# Patient Record
Sex: FEMALE | Race: WHITE | NOT HISPANIC OR LATINO | Employment: OTHER | URBAN - METROPOLITAN AREA
[De-identification: names, ages, dates, MRNs, and addresses within clinical notes are randomized per-mention and may not be internally consistent; named-entity substitution may affect disease eponyms.]

---

## 2018-03-15 ENCOUNTER — PATIENT MESSAGE (OUTPATIENT)
Dept: ADMINISTRATIVE | Facility: CLINIC | Age: 53
End: 2018-03-15

## 2019-06-19 ENCOUNTER — LAB VISIT (OUTPATIENT)
Dept: LAB | Facility: HOSPITAL | Age: 54
End: 2019-06-19
Attending: INTERNAL MEDICINE
Payer: MEDICARE

## 2019-06-19 DIAGNOSIS — Z85.41 HISTORY OF CERVICAL CANCER: ICD-10-CM

## 2019-06-19 LAB
ALBUMIN SERPL BCP-MCNC: 4.4 G/DL (ref 3.5–5.2)
ALP SERPL-CCNC: 79 U/L (ref 38–145)
ALT SERPL W/O P-5'-P-CCNC: 12 U/L (ref 10–44)
ANION GAP SERPL CALC-SCNC: 9 MMOL/L (ref 8–16)
AST SERPL-CCNC: 26 U/L (ref 14–36)
BASOPHILS # BLD AUTO: 0.07 K/UL (ref 0–0.2)
BASOPHILS NFR BLD: 0.9 % (ref 0–1.9)
BILIRUB SERPL-MCNC: 0.3 MG/DL (ref 0.2–1.3)
BUN SERPL-MCNC: 17 MG/DL (ref 7–18)
CALCIUM SERPL-MCNC: 10.2 MG/DL (ref 8.4–10.2)
CHLORIDE SERPL-SCNC: 100 MMOL/L (ref 95–110)
CO2 SERPL-SCNC: 30 MMOL/L (ref 22–31)
CREAT SERPL-MCNC: 0.66 MG/DL (ref 0.5–1.4)
DIFFERENTIAL METHOD: NORMAL
EOSINOPHIL # BLD AUTO: 0.2 K/UL (ref 0–0.5)
EOSINOPHIL NFR BLD: 2.6 % (ref 0–8)
ERYTHROCYTE [DISTWIDTH] IN BLOOD BY AUTOMATED COUNT: 13.2 % (ref 11.5–14.5)
EST. GFR  (AFRICAN AMERICAN): >60 ML/MIN/1.73 M^2
EST. GFR  (NON AFRICAN AMERICAN): >60 ML/MIN/1.73 M^2
GLUCOSE SERPL-MCNC: 94 MG/DL (ref 70–110)
HCT VFR BLD AUTO: 39 % (ref 37–48.5)
HGB BLD-MCNC: 12.9 G/DL (ref 12–16)
IMM GRANULOCYTES # BLD AUTO: 0.02 K/UL (ref 0–0.04)
IMM GRANULOCYTES NFR BLD AUTO: 0.3 % (ref 0–0.5)
LYMPHOCYTES # BLD AUTO: 2.1 K/UL (ref 1–4.8)
LYMPHOCYTES NFR BLD: 26.9 % (ref 18–48)
MCH RBC QN AUTO: 29.9 PG (ref 27–31)
MCHC RBC AUTO-ENTMCNC: 33.1 G/DL (ref 32–36)
MCV RBC AUTO: 91 FL (ref 82–98)
MONOCYTES # BLD AUTO: 0.5 K/UL (ref 0.3–1)
MONOCYTES NFR BLD: 6.5 % (ref 4–15)
NEUTROPHILS # BLD AUTO: 5 K/UL (ref 1.8–7.7)
NEUTROPHILS NFR BLD: 62.8 % (ref 38–73)
NRBC BLD-RTO: 0 /100 WBC
PLATELET # BLD AUTO: 268 K/UL (ref 150–350)
PMV BLD AUTO: 10 FL (ref 9.2–12.9)
POTASSIUM SERPL-SCNC: 4.5 MMOL/L (ref 3.5–5.1)
PROT SERPL-MCNC: 7.9 G/DL (ref 6–8.4)
RBC # BLD AUTO: 4.31 M/UL (ref 4–5.4)
SODIUM SERPL-SCNC: 139 MMOL/L (ref 136–145)
WBC # BLD AUTO: 7.97 K/UL (ref 3.9–12.7)

## 2019-06-19 PROCEDURE — 85025 COMPLETE CBC W/AUTO DIFF WBC: CPT | Mod: PN

## 2019-06-19 PROCEDURE — 36415 COLL VENOUS BLD VENIPUNCTURE: CPT | Mod: PN

## 2019-06-19 PROCEDURE — 80053 COMPREHEN METABOLIC PANEL: CPT

## 2019-06-19 PROCEDURE — 80053 COMPREHEN METABOLIC PANEL: CPT | Mod: PN

## 2019-06-19 PROCEDURE — 85025 COMPLETE CBC W/AUTO DIFF WBC: CPT

## 2019-07-16 ENCOUNTER — OFFICE VISIT (OUTPATIENT)
Dept: FAMILY MEDICINE | Facility: CLINIC | Age: 54
End: 2019-07-16
Payer: MEDICARE

## 2019-07-16 VITALS
BODY MASS INDEX: 19.97 KG/M2 | HEIGHT: 64 IN | HEART RATE: 70 BPM | DIASTOLIC BLOOD PRESSURE: 80 MMHG | WEIGHT: 117 LBS | SYSTOLIC BLOOD PRESSURE: 142 MMHG

## 2019-07-16 DIAGNOSIS — R19.7 DIARRHEA, UNSPECIFIED TYPE: Chronic | ICD-10-CM

## 2019-07-16 DIAGNOSIS — F11.20 UNCOMPLICATED OPIOID DEPENDENCE: Chronic | ICD-10-CM

## 2019-07-16 DIAGNOSIS — G89.29 CHRONIC MIDLINE LOW BACK PAIN WITHOUT SCIATICA: Chronic | ICD-10-CM

## 2019-07-16 DIAGNOSIS — Z85.41 HISTORY OF CERVICAL CANCER: ICD-10-CM

## 2019-07-16 DIAGNOSIS — M54.50 CHRONIC MIDLINE LOW BACK PAIN WITHOUT SCIATICA: Chronic | ICD-10-CM

## 2019-07-16 DIAGNOSIS — R63.4 WEIGHT LOSS: Chronic | ICD-10-CM

## 2019-07-16 DIAGNOSIS — Z12.39 BREAST CANCER SCREENING: ICD-10-CM

## 2019-07-16 DIAGNOSIS — R10.33 PERIUMBILICAL ABDOMINAL PAIN: Primary | Chronic | ICD-10-CM

## 2019-07-16 PROCEDURE — 3008F PR BODY MASS INDEX (BMI) DOCUMENTED: ICD-10-PCS | Mod: ,,, | Performed by: INTERNAL MEDICINE

## 2019-07-16 PROCEDURE — 99204 PR OFFICE/OUTPT VISIT, NEW, LEVL IV, 45-59 MIN: ICD-10-PCS | Mod: ,,, | Performed by: INTERNAL MEDICINE

## 2019-07-16 PROCEDURE — 3008F BODY MASS INDEX DOCD: CPT | Mod: ,,, | Performed by: INTERNAL MEDICINE

## 2019-07-16 PROCEDURE — 99204 OFFICE O/P NEW MOD 45 MIN: CPT | Mod: ,,, | Performed by: INTERNAL MEDICINE

## 2019-07-16 RX ORDER — PANTOPRAZOLE SODIUM 40 MG/1
40 TABLET, DELAYED RELEASE ORAL DAILY
Refills: 3 | COMMUNITY
Start: 2019-07-01 | End: 2021-05-07

## 2019-07-16 NOTE — PROGRESS NOTES
Subjective:       Patient ID: Charleen Louis is a 53 y.o. female.    Chief Complaint: Establish Care; Abdominal Pain; Low-back Pain; Cervical Cancer; and Gastroesophageal Reflux    Patient is a 53-year-old  female who comes to establish with me as a new patient. Her medical history is significant for diagnosis of cervical cancer in 2009 following which she had a radical hysterectomy and salpingo-oophorectomy. This was followed with radiation as well as chemotherapy. This was all done in [facility]    Sometimes around 2013 she moved to the [facility]. She had established Oncology care with Dr. Kal Yao at that point. Following her surgery and chemotherapy she had developed intractable diarrhea. Somewhere around that time she had resection of approximately 3 feet of small intestine. 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 It is unclear if she had any infectious process at that point.    She also has chronic back problems with herniated disks which at this point is attributed to radiation. She has no history of trauma or accident.    Following all these she had also lost considerable amount of weight.    She is currently disabled and is on [employer] Medicare plan. She is nonalcohol user (rare daiquiriu0    She has been smoking since the age of 17. Current usage is approximately half pack per day.    Preventive care issues are still pending and unconfirmed at this point. She is due for mammogram. She has not had a colonoscopy which is somewhat technically difficult in her case possibly because of some stricture. She is also probably pending for shingles vaccine, tetanus vaccination and pneumonia vaccinations. Yearly flu shots have been recommended to her.    One  son lives in [place] and another [place].    She has a 17 year son who may have some special needs.    She is not in any active relation ship at this  point.      Past Medical History:   Diagnosis Date    Blood transfusion     Cancer 2009    Cervical    Wears glasses      Social History     Socioeconomic History    Marital status: Single     Spouse name: Not on file    Number of children: 3    Years of education: Not on file    Highest education level: Not on file   Occupational History    Occupation: Disabled    Occupation: Ex /office   Social Needs    Financial resource strain: Not on file    Food insecurity:     Worry: Not on file     Inability: Not on file    Transportation needs:     Medical: Not on file     Non-medical: Not on file   Tobacco Use    Smoking status: Current Every Day Smoker     Packs/day: 0.50     Years: 25.00     Pack years: 12.50     Types: Cigarettes     Start date: 1/1/1982    Smokeless tobacco: Current User   Substance and Sexual Activity    Alcohol use: No     Comment: occ daiquiri    Drug use: No    Sexual activity: Not Currently   Lifestyle    Physical activity:     Days per week: Not on file     Minutes per session: Not on file    Stress: Very much   Relationships    Social connections:     Talks on phone: Not on file     Gets together: Not on file     Attends [employer] service: Not on file     Active member of club or organization: Not on file     Attends meetings of clubs or organizations: Not on file     Relationship status: Not on file   Other Topics Concern    Not on file   Social History Narrative    17 Y Old with her at home    2 other grown and gone     Past Surgical History:   Procedure Laterality Date    CHOLECYSTECTOMY      HYSTERECTOMY      BSO    Port-a-Cath placement      REMOVAL, CATHETER, CENTRAL VENOUS, TUNNELED, WITH PORT Right 11/13/2013    Performed by Nick Kimbrough MD at [facility] OR    some of intestines removed       History reviewed. No pertinent family history.    Review of Systems   Constitutional: Positive for fatigue. Negative for activity change, chills, fever and  "unexpected weight change.   HENT: Negative for congestion, postnasal drip and sinus pressure.    Eyes: Negative for pain, discharge and visual disturbance.   Respiratory: Negative for cough, chest tightness and shortness of breath.    Cardiovascular: Negative for chest pain, palpitations and leg swelling.   Gastrointestinal: Positive for abdominal pain and diarrhea (stable with opaites). Negative for abdominal distention, anal bleeding and constipation.        Resection of colon - radiation damage and adhesions .   Genitourinary: Negative for difficulty urinating, dysuria, flank pain, frequency, menstrual problem and pelvic pain.        H/O Cervical Cancer 2009- Radical hysterectomy  [place] followed by RT and Chemo  30 weeks of external radiation and internal implants  Metastases   Musculoskeletal: Positive for back pain. Negative for arthralgias and joint swelling.        Back pain - herniated discs Blames radiation for it    Skin: Negative for color change, pallor and rash.   Allergic/Immunologic: Negative for environmental allergies, food allergies and immunocompromised state.   Neurological: Negative for dizziness, tremors, seizures, syncope, light-headedness and headaches.   Hematological: Negative for adenopathy. Does not bruise/bleed easily.   Psychiatric/Behavioral: Negative for agitation, confusion and dysphoric mood. The patient is not nervous/anxious.          Objective:      Blood pressure (!) 142/80, pulse 70, height 5' 4" (1.626 m), weight 53.1 kg (117 lb). Body mass index is 20.08 kg/m².  Physical Exam   Constitutional: She appears well-developed and well-nourished. She is cooperative. No distress.   HENT:   Head: Normocephalic and atraumatic.   Right Ear: Tympanic membrane normal.   Left Ear: Tympanic membrane normal.   Mouth/Throat: She has dentures (upper jaw).   Dry mouth   Eyes: Pupils are equal, round, and reactive to light. Conjunctivae, EOM and lids are normal. Lids are everted and swept, no " foreign bodies found. Right pupil is round and reactive. Left pupil is round and reactive.   Neck: Trachea normal and normal range of motion. Neck supple.   Cardiovascular: Normal rate, regular rhythm, S1 normal, S2 normal and normal heart sounds.   Pulmonary/Chest: Breath sounds normal.   Abdominal: Soft. Bowel sounds are normal. There is no rigidity and no guarding.       Musculoskeletal:        Back:    Lymphadenopathy:     She has no cervical adenopathy.     She has no axillary adenopathy.   Neurological: She is alert.   Skin: Skin is warm and dry.   Nursing note and vitals reviewed.        Assessment:       1. Periumbilical abdominal pain    2. Chronic midline low back pain without sciatica    3. Breast cancer screening    4. Weight loss    5. Diarrhea, unspecified type    6. Uncomplicated opioid dependence    7. History of cervical cancer           Lab Visit on 06/19/2019   Component Date Value Ref Range Status    WBC 06/19/2019 7.97  3.90 - 12.70 K/uL Final    RBC 06/19/2019 4.31  4.00 - 5.40 M/uL Final    Hemoglobin 06/19/2019 12.9  12.0 - 16.0 g/dL Final    Hematocrit 06/19/2019 39.0  37.0 - 48.5 % Final    Mean Corpuscular Volume 06/19/2019 91  82 - 98 fL Final    Mean Corpuscular Hemoglobin 06/19/2019 29.9  27.0 - 31.0 pg Final    Mean Corpuscular Hemoglobin Conc 06/19/2019 33.1  32.0 - 36.0 g/dL Final    RDW 06/19/2019 13.2  11.5 - 14.5 % Final    Platelets 06/19/2019 268  150 - 350 K/uL Final    MPV 06/19/2019 10.0  9.2 - 12.9 fL Final    Immature Granulocytes 06/19/2019 0.3  0.0 - 0.5 % Final    Gran # (ANC) 06/19/2019 5.0  1.8 - 7.7 K/uL Final    Immature Grans (Abs) 06/19/2019 0.02  0.00 - 0.04 K/uL Final    Lymph # 06/19/2019 2.1  1.0 - 4.8 K/uL Final    Mono # 06/19/2019 0.5  0.3 - 1.0 K/uL Final    Eos # 06/19/2019 0.2  0.0 - 0.5 K/uL Final    Baso # 06/19/2019 0.07  0.00 - 0.20 K/uL Final    nRBC 06/19/2019 0  0 /100 WBC Final    Gran% 06/19/2019 62.8  38.0 - 73.0 % Final     Lymph% 06/19/2019 26.9  18.0 - 48.0 % Final    Mono% 06/19/2019 6.5  4.0 - 15.0 % Final    Eosinophil% 06/19/2019 2.6  0.0 - 8.0 % Final    Basophil% 06/19/2019 0.9  0.0 - 1.9 % Final    Differential Method 06/19/2019 Automated   Final    Sodium 06/19/2019 139  136 - 145 mmol/L Final    Potassium 06/19/2019 4.5  3.5 - 5.1 mmol/L Final    Chloride 06/19/2019 100  95 - 110 mmol/L Final    CO2 06/19/2019 30  22 - 31 mmol/L Final    Glucose 06/19/2019 94  70 - 110 mg/dL Final    BUN, Bld 06/19/2019 17  7 - 18 mg/dL Final    Creatinine 06/19/2019 0.66  0.50 - 1.40 mg/dL Final    Calcium 06/19/2019 10.2  8.4 - 10.2 mg/dL Final    Total Protein 06/19/2019 7.9  6.0 - 8.4 g/dL Final    Albumin 06/19/2019 4.4  3.5 - 5.2 g/dL Final    Total Bilirubin 06/19/2019 0.3  0.2 - 1.3 mg/dL Final    Alkaline Phosphatase 06/19/2019 79  38 - 145 U/L Final    AST 06/19/2019 26  14 - 36 U/L Final    ALT 06/19/2019 12  10 - 44 U/L Final    Anion Gap 06/19/2019 9  8 - 16 mmol/L Final    eGFR if African American 06/19/2019 >60  >60 mL/min/1.73 m^2 Final    eGFR if non African American 06/19/2019 >60  >60 mL/min/1.73 m^2 Final         Plan:           Periumbilical abdominal pain  Comments:  Cause of this periumbilical pain is still unclear to me. History of colon resection. History of cervical cancer.    Chronic midline low back pain without sciatica  Comments:  Patient reports degenerative disc disease and arthritis.    Breast cancer screening  -     Mammo Digital Screening Bilat; Future; Expected date: 07/16/2019    Weight loss  Comments:  After being treated for cervical cancer and also having resection of colon she has lost approximately 30-40 pounds of weight over several years.    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    Uncomplicated opioid dependence  Comments:  Stable  dependency on opiate medications at this point.    History of cervical cancer      Patient has been advised to watch diet and exercise. Avoid fried and fatty food. Compliance to medications and follow up urged.  Mammogram ordered      Spent about 40-45 minutes with review of current condition and past medical records. Percent of time with face-to-face discussion on use of opiate medications, concomitant mood disorder or issues.    Discussed about tobacco dependence and plans to quit smoking.    Discussed about long-term goals, setting up small goals that time and issues of self-esteem, confidence and acceptance and peace within    Fup 6 weeks        Current Outpatient Medications:     HYDROmorphone (DILAUDID) 4 MG tablet, Take 1 tablet (4 mg total) by mouth every 4 (four) hours as needed for Pain., Disp: 180 tablet, Rfl: 0    morphine (MS CONTIN) 30 MG 12 hr tablet, Take 2 tablets (60 mg total) by mouth 2 (two) times daily., Disp: 120 tablet, Rfl: 0    pantoprazole (PROTONIX) 40 MG tablet, Take 40 mg by mouth once daily., Disp: , Rfl: 3

## 2019-07-16 NOTE — PATIENT INSTRUCTIONS
Abdominal Pain    Abdominal pain is pain in the stomach or belly area. Everyone has this pain from time to time. In many cases it goes away on its own. But abdominal pain can sometimes be due to a serious problem, such as appendicitis. So its important to know when to seek help.  Causes of abdominal pain  There are many possible causes of abdominal pain. Common causes in adults include:  · Constipation, diarrhea, or gas  · Stomach acid flowing back up into the esophagus (acid reflux or heartburn)  · Severe acid reflux, called GERD (gastroesophageal reflux disease)  · A sore in the lining of the stomach or small intestine (peptic ulcer)  · Inflammation of the gallbladder, liver, or pancreas  · Gallstones or kidney stones  · Appendicitis   · Intestinal blockage   · An internal organ pushing through a muscle or other tissue (hernia)  · Urinary tract infections  · In women, menstrual cramps, fibroids, or endometriosis  · Inflammation or infection of the intestines  Diagnosing the cause of abdominal pain  Your healthcare provider will do a physical exam help find the cause of your pain. If needed, tests will be ordered. Belly pain has many possible causes. So it can be hard to find the reason for your pain. Giving details about your pain can help. Tell your provider where and when you feel the pain, and what makes it better or worse. Also let your provider know if you have other symptoms such as:  · Fever  · Tiredness  · Upset stomach (nausea)  · Vomiting  · Changes in bathroom habits  Treating abdominal pain  Some causes of pain need emergency medical treatment right away. These include appendicitis or a bowel blockage. Other problems can be treated with rest, fluids, or medicines. Your healthcare provider can give you specific instructions for treatment or self-care based on what is causing your pain.  If you have vomiting or diarrhea, sip water or other clear fluids. When you are ready to eat solid foods again,  start with small amounts of easy-to-digest, low-fat foods. These include apple sauce, toast, or crackers.   When to seek medical care  Call 911 or go to the hospital right away if you:  · Cant pass stool and are vomiting  · Are vomiting blood or have bloody diarrhea or black, tarry diarrhea  · Have chest, neck, or shoulder pain  · Feel like you might pass out  · Have pain in your shoulder blades with nausea  · Have sudden, severe belly pain  · Have new, severe pain unlike any you have felt before  · Have a belly that is rigid, hard, and tender to touch  Call your healthcare provider if you have:  · Pain for more than 5 days  · Bloating for more than 2 days  · Diarrhea for more than 5 days  · A fever of 100.4°F (38.0°C) or higher, or as directed by your provider  · Pain that gets worse  · Weight loss for no reason  · Continued lack of appetite  · Blood in your stool  How to prevent abdominal pain  Here are some tips to help prevent abdominal pain:  · Eat smaller amounts of food at one time.  · Avoid greasy, fried, or other high-fat foods.  · Avoid foods that give you gas.  · Exercise regularly.  · Drink plenty of fluids.  To help prevent GERD symptoms:  · Quit smoking.  · Reduce alcohol and certain foods that increase stomach acid.  · Avoid aspirin and over-the-counter pain and fever medicines (NSAIDS or nonsteroidal anti-inflammatory drugs), if possible  · Lose extra weight.  · Finish eating at least 2 hours before you go to bed or lie down.  · Raise the head of your bed.  Date Last Reviewed: 7/1/2016  © 7409-1187 [employer]. [address] [place] [address], [place] [address]. All rights reserved. This information is not intended as a substitute for professional medical care. Always follow your healthcare professional's instructions.

## 2019-08-14 ENCOUNTER — TELEPHONE (OUTPATIENT)
Dept: SURGERY | Facility: CLINIC | Age: 54
End: 2019-08-14

## 2019-08-14 NOTE — TELEPHONE ENCOUNTER
----- Message from Tl Gonzalez sent at 8/14/2019  1:19 PM CDT -----  Contact: pt  Type:  Patient Returning Call    Who Called:  pt  Who Left Message for Patient:  Emile  Does the patient know what this is regarding?:    Best Call Back Number:  [phone]  Additional Information:

## 2019-08-14 NOTE — TELEPHONE ENCOUNTER
I called patient and scheduled her to see Dr. Callejas in [place] on Tuesday, 8/27/19 at 12:15pm.  Emile

## 2019-08-27 ENCOUNTER — OFFICE VISIT (OUTPATIENT)
Dept: SURGERY | Facility: CLINIC | Age: 54
End: 2019-08-27
Payer: MEDICARE

## 2019-08-27 DIAGNOSIS — R10.84 GENERALIZED ABDOMINAL PAIN: ICD-10-CM

## 2019-08-27 PROCEDURE — 99999 PR PBB SHADOW E&M-EST. PATIENT-LVL II: ICD-10-PCS | Mod: PBBFAC,,, | Performed by: SURGERY

## 2019-08-27 PROCEDURE — 99204 PR OFFICE/OUTPT VISIT, NEW, LEVL IV, 45-59 MIN: ICD-10-PCS | Mod: S$GLB,,, | Performed by: SURGERY

## 2019-08-27 PROCEDURE — 99204 OFFICE O/P NEW MOD 45 MIN: CPT | Mod: S$GLB,,, | Performed by: SURGERY

## 2019-08-27 PROCEDURE — 99999 PR PBB SHADOW E&M-EST. PATIENT-LVL II: CPT | Mod: PBBFAC,,, | Performed by: SURGERY

## 2019-08-27 NOTE — LETTER
August 27, 2019      Nolan Toledo MD  [address] [place] [address] [facility]  [facility] [address]           [facility] General Surgery  [address]  [facility] [address]  Phone: [phone]          Patient: Charleen Louis   MR Number: [id number]   YOB: 1965   Date of Visit: 8/27/2019       Dear Dr. Nolan Toledo:    Thank you for referring Charleen Louis to me for evaluation. Attached you will find relevant portions of my assessment and plan of care.    If you have questions, please do not hesitate to call me. I look forward to following Charleen Louis along with you.    Sincerely,    Segundo Callejas MD    Enclosure  CC:  No Recipients    If you would like to receive this communication electronically, please contact [email or web address] or [phone] to request more information on [employer] access.    For providers and/or their staff who would like to refer a patient to [facility], please contact us through our one-stop-shop provider referral line, [facility], at [phone].    If you feel you have received this communication in error or would no longer like to receive these types of communications, please e-mail [email or web address]

## 2019-08-27 NOTE — PROGRESS NOTES
Subjective:       Patient ID: Charleen Louis is a 54 y.o. female.    Chief Complaint: No chief complaint on file.    HPI  Pleasant 53 yo F referred to me for evaluation of possible colonic stricture. Pt notes that she had cervical cancer in the remote past.  She was treated with radical hysterectomy and received both chemo and radiation therapy.  Pt notes that as a result of that treatment she has been having significant abodminal pains and complaints for years.  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 Pt reports that she has had two previous attempts at colonoscopy in 2010 which could not be completed secondary to stricture per the pt.  She notes that she has chronic pain.  Has swelling in her abdomen.   Pt notes that given distention and bloating it has caused significant dietary changes and with that accompanying weight loss.  She feels swelling is from gastroparesis as she states that she has history of gastroparesis.  Pt denies other PMHx.  NO other significant PShx other then hysterectom and SBR.  Pt had PET/CT scan in 7/19 with no evidence of disease noted.   Review of Systems   Constitutional: Positive for appetite change and unexpected weight change. Negative for activity change and fever.   Respiratory: Negative for chest tightness, shortness of breath and wheezing.    Cardiovascular: Negative for chest pain.   Gastrointestinal: Positive for abdominal distention and diarrhea. Negative for abdominal pain, anal bleeding, blood in stool, constipation, nausea, rectal pain and vomiting.   Genitourinary: Negative for difficulty urinating, dysuria and frequency.   Skin: Negative for color change and wound.   Neurological: Negative for dizziness.   Hematological: Negative for adenopathy.   Psychiatric/Behavioral: Negative for agitation and decreased concentration.       Objective:      Physical Exam   Constitutional: She is oriented to  person, place, and time. She appears well-developed and well-nourished.   HENT:   Head: Normocephalic and atraumatic.   Eyes: Pupils are equal, round, and reactive to light. Right eye exhibits no discharge. Left eye exhibits no discharge. No scleral icterus.   Neck: Normal range of motion. Neck supple. No tracheal deviation present. No thyromegaly present.   Cardiovascular: Normal rate, regular rhythm and normal heart sounds.   No murmur heard.  Pulmonary/Chest: Effort normal and breath sounds normal. She exhibits no tenderness.   Abdominal: Soft. Bowel sounds are normal. She exhibits no distension, no abdominal bruit, no pulsatile midline mass and no mass. There is no hepatosplenomegaly. There is no tenderness. There is no rigidity, no rebound, no guarding, no tenderness at McBurney's point and negative Caba's sign. No hernia. Hernia confirmed negative in the ventral area.   Genitourinary: Rectum normal.   Musculoskeletal: Normal range of motion. She exhibits no edema, tenderness or deformity.   Lymphadenopathy:     She has no cervical adenopathy.   Neurological: She is alert and oriented to person, place, and time.   Skin: Skin is warm. No rash noted. No erythema.   Psychiatric: She has a normal mood and affect.   Vitals reviewed.      Assessment:       1. Generalized abdominal pain        Plan:       Lengthy d/w pt. Will need to obtain updated radiographic images. I have recommended beginning with ct scan of the abd pevlis to evaluate the area and to assess for enteritis.  Also will need to perform BE to assess for stricture.  WIll make further recommendations based on the results of these findings.

## 2019-08-28 ENCOUNTER — LAB VISIT (OUTPATIENT)
Dept: LAB | Facility: HOSPITAL | Age: 54
End: 2019-08-28
Attending: SURGERY
Payer: MEDICARE

## 2019-08-28 ENCOUNTER — OFFICE VISIT (OUTPATIENT)
Dept: FAMILY MEDICINE | Facility: CLINIC | Age: 54
End: 2019-08-28
Payer: MEDICARE

## 2019-08-28 VITALS
WEIGHT: 115 LBS | SYSTOLIC BLOOD PRESSURE: 137 MMHG | HEIGHT: 64 IN | BODY MASS INDEX: 19.63 KG/M2 | HEART RATE: 84 BPM | DIASTOLIC BLOOD PRESSURE: 86 MMHG

## 2019-08-28 DIAGNOSIS — F41.1 GENERALIZED ANXIETY DISORDER: ICD-10-CM

## 2019-08-28 DIAGNOSIS — F32.9 REACTIVE DEPRESSION: ICD-10-CM

## 2019-08-28 DIAGNOSIS — R63.4 WEIGHT LOSS: ICD-10-CM

## 2019-08-28 DIAGNOSIS — E78.2 MIXED HYPERLIPIDEMIA: ICD-10-CM

## 2019-08-28 DIAGNOSIS — R10.84 GENERALIZED ABDOMINAL PAIN: ICD-10-CM

## 2019-08-28 DIAGNOSIS — R10.33 PERIUMBILICAL ABDOMINAL PAIN: Primary | ICD-10-CM

## 2019-08-28 LAB
CREAT SERPL-MCNC: 0.8 MG/DL (ref 0.5–1.4)
EST. GFR  (AFRICAN AMERICAN): >60 ML/MIN/1.73 M^2
EST. GFR  (NON AFRICAN AMERICAN): >60 ML/MIN/1.73 M^2

## 2019-08-28 PROCEDURE — 3008F BODY MASS INDEX DOCD: CPT | Mod: S$GLB,,, | Performed by: INTERNAL MEDICINE

## 2019-08-28 PROCEDURE — 99213 OFFICE O/P EST LOW 20 MIN: CPT | Mod: S$GLB,,, | Performed by: INTERNAL MEDICINE

## 2019-08-28 PROCEDURE — 99999 PR PBB SHADOW E&M-EST. PATIENT-LVL IV: ICD-10-PCS | Mod: PBBFAC,,, | Performed by: INTERNAL MEDICINE

## 2019-08-28 PROCEDURE — 82565 ASSAY OF CREATININE: CPT

## 2019-08-28 PROCEDURE — 36415 COLL VENOUS BLD VENIPUNCTURE: CPT

## 2019-08-28 PROCEDURE — 3008F PR BODY MASS INDEX (BMI) DOCUMENTED: ICD-10-PCS | Mod: S$GLB,,, | Performed by: INTERNAL MEDICINE

## 2019-08-28 PROCEDURE — 99999 PR PBB SHADOW E&M-EST. PATIENT-LVL IV: CPT | Mod: PBBFAC,,, | Performed by: INTERNAL MEDICINE

## 2019-08-28 PROCEDURE — 99213 PR OFFICE/OUTPT VISIT, EST, LEVL III, 20-29 MIN: ICD-10-PCS | Mod: S$GLB,,, | Performed by: INTERNAL MEDICINE

## 2019-08-28 NOTE — PATIENT INSTRUCTIONS
Abdominal Pain    Abdominal pain is pain in the stomach or belly area. Everyone has this pain from time to time. In many cases it goes away on its own. But abdominal pain can sometimes be due to a serious problem, such as appendicitis. So its important to know when to seek help.  Causes of abdominal pain  There are many possible causes of abdominal pain. Common causes in adults include:  · Constipation, diarrhea, or gas  · Stomach acid flowing back up into the esophagus (acid reflux or heartburn)  · Severe acid reflux, called GERD (gastroesophageal reflux disease)  · A sore in the lining of the stomach or small intestine (peptic ulcer)  · Inflammation of the gallbladder, liver, or pancreas  · Gallstones or kidney stones  · Appendicitis   · Intestinal blockage   · An internal organ pushing through a muscle or other tissue (hernia)  · Urinary tract infections  · In women, menstrual cramps, fibroids, or endometriosis  · Inflammation or infection of the intestines  Diagnosing the cause of abdominal pain  Your healthcare provider will do a physical exam help find the cause of your pain. If needed, tests will be ordered. Belly pain has many possible causes. So it can be hard to find the reason for your pain. Giving details about your pain can help. Tell your provider where and when you feel the pain, and what makes it better or worse. Also let your provider know if you have other symptoms such as:  · Fever  · Tiredness  · Upset stomach (nausea)  · Vomiting  · Changes in bathroom habits  Treating abdominal pain  Some causes of pain need emergency medical treatment right away. These include appendicitis or a bowel blockage. Other problems can be treated with rest, fluids, or medicines. Your healthcare provider can give you specific instructions for treatment or self-care based on what is causing your pain.  If you have vomiting or diarrhea, sip water or other clear fluids. When you are ready to eat solid foods again,  start with small amounts of easy-to-digest, low-fat foods. These include apple sauce, toast, or crackers.   When to seek medical care  Call 911 or go to the hospital right away if you:  · Cant pass stool and are vomiting  · Are vomiting blood or have bloody diarrhea or black, tarry diarrhea  · Have chest, neck, or shoulder pain  · Feel like you might pass out  · Have pain in your shoulder blades with nausea  · Have sudden, severe belly pain  · Have new, severe pain unlike any you have felt before  · Have a belly that is rigid, hard, and tender to touch  Call your healthcare provider if you have:  · Pain for more than 5 days  · Bloating for more than 2 days  · Diarrhea for more than 5 days  · A fever of 100.4°F (38.0°C) or higher, or as directed by your provider  · Pain that gets worse  · Weight loss for no reason  · Continued lack of appetite  · Blood in your stool  How to prevent abdominal pain  Here are some tips to help prevent abdominal pain:  · Eat smaller amounts of food at one time.  · Avoid greasy, fried, or other high-fat foods.  · Avoid foods that give you gas.  · Exercise regularly.  · Drink plenty of fluids.  To help prevent GERD symptoms:  · Quit smoking.  · Reduce alcohol and certain foods that increase stomach acid.  · Avoid aspirin and over-the-counter pain and fever medicines (NSAIDS or nonsteroidal anti-inflammatory drugs), if possible  · Lose extra weight.  · Finish eating at least 2 hours before you go to bed or lie down.  · Raise the head of your bed.  Date Last Reviewed: 7/1/2016  © 7137-1742 [employer]. [address] [place] [address], [place] [address]. All rights reserved. This information is not intended as a substitute for professional medical care. Always follow your healthcare professional's instructions.        Anxiety Reaction  Anxiety is the feeling we all get when we think something bad might happen. It is a normal response to stress and usually causes only a mild reaction.  When anxiety becomes more severe, it can interfere with daily life. In some cases, you may not even be aware of what it is youre anxious about. There may also be a genetic link or it may be a learned behavior in the home.  Both psychological and physical triggers cause stress reaction. It's often a response to fear or emotional stress, real or imagined. This stress may come from home, family, work, or social relationships.  During an anxiety reaction, you may feel:  · Helpless  · Nervous  · Depressed  · Irritable  Your body may show signs of anxiety in many ways. You may experience:  · Dry mouth  · Shakiness  · Dizziness  · Weakness  · Trouble breathing  · Breathing fast (hyperventilating)  · Chest pressure  · Sweating  · Headache  · Nausea  · Diarrhea  · Tiredness  · Inability to sleep  · Sexual problems  Home care  · Try to locate the sources of stress in your life. They may not be obvious. These may include:  ¨ Daily hassles of life (traffic jams, missed appointments, car troubles, etc.)  ¨ Major life changes, both good (new baby, job promotion) and bad (loss of job, loss of loved one)  ¨ Overload: feeling that you have too many responsibilities and can't take care of all of them at once  ¨ Feeling helpless, feeling that your problems are beyond what youre able to solve  · Notice how your body reacts to stress. Learn to listen to your body signals. This will help you take action before the stress becomes severe.  · When you can, do something about the source of your stress. (Avoid hassles, limit the amount of change that happens in your life at one time and take a break when you feel overloaded).  · Unfortunately, many stressful situations can't be avoided. It is necessary to learn how to better manage stress. There are many proven methods that will reduce your anxiety. These include simple things like exercise, good nutrition and adequate rest. Also, there are certain techniques that are  helpful:  ¨ Relaxation  ¨ Breathing exercises  ¨ Visualization  ¨ Biofeedback  ¨ Meditation  For more information about this, consult your doctor or go to a local bookstore and review the many books and tapes available on this subject.  Follow-up care  If you feel that your anxiety is not responding to self-help measures, contact your doctor or make an appointment with a counselor. You may need short-term psychological counseling and temporary medicine to help you manage stress.  Call 911  Call your healthcare provider right away if any of these occur:  · Trouble breathing  · Confusion  · Drowsiness or trouble wakening  · Fainting or loss of consciousness  · Rapid heart rate  · Seizure  · New chest pain that becomes more severe, lasts longer, or spreads into your shoulder, arm, neck, jaw, or back  When to seek medical advice  Call your healthcare provider right away if any of these occur:  · Your symptoms get worse  · Severe headache not relieved by rest and mild pain reliever  Date Last Reviewed: 9/29/2015  © 0963-9285 [employer], [employer]. [address] [place] [address], [place] [address]. All rights reserved. This information is not intended as a substitute for professional medical care. Always follow your healthcare professional's instructions.

## 2019-08-29 ENCOUNTER — HOSPITAL ENCOUNTER (OUTPATIENT)
Dept: RADIOLOGY | Facility: HOSPITAL | Age: 54
Discharge: HOME | End: 2019-08-29
Attending: SURGERY
Payer: MEDICARE

## 2019-08-29 DIAGNOSIS — R10.84 GENERALIZED ABDOMINAL PAIN: ICD-10-CM

## 2019-08-29 PROCEDURE — 74177 CT ABD & PELVIS W/CONTRAST: CPT | Mod: TC

## 2019-08-29 PROCEDURE — 25500020 PHARM REV CODE 255: Performed by: SURGERY

## 2019-08-29 RX ADMIN — IOHEXOL 100 ML: 350 INJECTION, SOLUTION INTRAVENOUS at 10:08

## 2019-09-06 ENCOUNTER — HOSPITAL ENCOUNTER (OUTPATIENT)
Dept: RADIOLOGY | Facility: HOSPITAL | Age: 54
Discharge: HOME | End: 2019-09-06
Attending: SURGERY
Payer: MEDICARE

## 2019-09-06 DIAGNOSIS — R10.84 GENERALIZED ABDOMINAL PAIN: ICD-10-CM

## 2019-09-06 PROCEDURE — 74270 X-RAY XM COLON 1CNTRST STD: CPT | Mod: 26,,, | Performed by: RADIOLOGY

## 2019-09-06 PROCEDURE — 74270 X-RAY XM COLON 1CNTRST STD: CPT | Mod: TC

## 2019-09-06 PROCEDURE — 74270 FL BARIUM ENEMA: ICD-10-PCS | Mod: 26,,, | Performed by: RADIOLOGY

## 2019-09-18 ENCOUNTER — TELEPHONE (OUTPATIENT)
Dept: SURGERY | Facility: CLINIC | Age: 54
End: 2019-09-18

## 2019-09-18 NOTE — TELEPHONE ENCOUNTER
----- Message from Vandana Rivera sent at 9/18/2019  2:29 PM CDT -----  Type: Needs Medical Advice    Who Called:  Patient  Best Call Back Number: [phone]  Additional Information: Patient would like to speak with nurse concerning last visit and what is next/please call patient back to advise.

## 2019-09-21 ENCOUNTER — TELEPHONE (OUTPATIENT)
Dept: SURGERY | Facility: CLINIC | Age: 54
End: 2019-09-21

## 2019-09-21 NOTE — TELEPHONE ENCOUNTER
Attempted to call pt to notify results of studies.  NO significant findings.  Do not feel surgery is indicated in this pt.  Will have office reach out to pt

## 2019-09-23 ENCOUNTER — TELEPHONE (OUTPATIENT)
Dept: SURGERY | Facility: CLINIC | Age: 54
End: 2019-09-23

## 2019-09-23 NOTE — TELEPHONE ENCOUNTER
----- Message from Osiris Mcgregor sent at 9/23/2019  1:20 PM CDT -----  Contact: patient  Type: Needs Medical Advice    Who Called:  patient  Symptoms (please be specific):  na  How long has patient had these symptoms:  chong  Pharmacy name and phone #:  chong  Best Call Back Number: [phone]  Additional Information: Patient states she received call from Dr. Callejas Saturday and was told to return call toady. Please call to advise. Thanks!

## 2019-10-29 ENCOUNTER — OFFICE VISIT (OUTPATIENT)
Dept: PSYCHIATRY | Facility: CLINIC | Age: 54
End: 2019-10-29
Payer: MEDICARE

## 2019-10-29 VITALS
SYSTOLIC BLOOD PRESSURE: 118 MMHG | DIASTOLIC BLOOD PRESSURE: 79 MMHG | HEART RATE: 78 BPM | HEIGHT: 63 IN | WEIGHT: 116.63 LBS | BODY MASS INDEX: 20.66 KG/M2

## 2019-10-29 DIAGNOSIS — F32.A ANXIETY AND DEPRESSION: Primary | ICD-10-CM

## 2019-10-29 DIAGNOSIS — F41.9 ANXIETY AND DEPRESSION: Primary | ICD-10-CM

## 2019-10-29 PROCEDURE — 3008F PR BODY MASS INDEX (BMI) DOCUMENTED: ICD-10-PCS | Mod: CPTII,S$GLB,, | Performed by: PSYCHOLOGIST

## 2019-10-29 PROCEDURE — 99202 PR OFFICE/OUTPT VISIT, NEW, LEVL II, 15-29 MIN: ICD-10-PCS | Mod: S$GLB,,, | Performed by: PSYCHOLOGIST

## 2019-10-29 PROCEDURE — 99999 PR PBB SHADOW E&M-EST. PATIENT-LVL III: CPT | Mod: PBBFAC,,, | Performed by: PSYCHOLOGIST

## 2019-10-29 PROCEDURE — 99999 PR PBB SHADOW E&M-EST. PATIENT-LVL III: ICD-10-PCS | Mod: PBBFAC,,, | Performed by: PSYCHOLOGIST

## 2019-10-29 PROCEDURE — 99202 OFFICE O/P NEW SF 15 MIN: CPT | Mod: S$GLB,,, | Performed by: PSYCHOLOGIST

## 2019-10-29 PROCEDURE — 3008F BODY MASS INDEX DOCD: CPT | Mod: CPTII,S$GLB,, | Performed by: PSYCHOLOGIST

## 2019-10-29 RX ORDER — TRAZODONE HYDROCHLORIDE 50 MG/1
TABLET ORAL
Qty: 30 TABLET | Refills: 1 | Status: SHIPPED | OUTPATIENT
Start: 2019-10-29 | End: 2019-12-20 | Stop reason: SDUPTHER

## 2019-10-29 RX ORDER — DULOXETIN HYDROCHLORIDE 30 MG/1
CAPSULE, DELAYED RELEASE ORAL
Qty: 30 CAPSULE | Refills: 1 | Status: SHIPPED | OUTPATIENT
Start: 2019-10-29 | End: 2019-12-20

## 2019-10-29 NOTE — PATIENT INSTRUCTIONS
Start Cymbalta 30mg hs  Start Trazodone 50mg 1/2 to 1 tablet at bedtime  Stagger start dates  Refer to therapy  RTC 4 weeks, call if problems with medications or concerns

## 2019-10-29 NOTE — PROGRESS NOTES
Client: Charleen Louis  : 1965  Suhail Tomlinson MD  [id number]    Presenting complaint:/Past psychiatric treatment: Charleen presented with a history of recurrent depression and anxiety that has escalated this year.  She has an adult son with a substance use problem.  She also has a remote history of substance abuse with her last use reported as  when she had a relapse.  One son also  in July and the son living with her has Asbergers.   On the PHQ9  her score today was 24  On the  GAD7  17.  She denied SI/HI/delusions/halluciantions/mood swings and expansive mood periods. She reported ease of frustration but sts this is situational.  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  She denied any other psychiatric inpatient care.  Stressors: death of 31yo son in July, another son   Has Asbergers and one adult son  in .      Family psychiatric history: none reported  Relevant lab reviewed  CMP CBC normal  Relevant EKG reviewed  ST st abnormalities    Review of patient's allergies indicates:  No Known Allergies    Current Outpatient Medications:     HYDROmorphone (DILAUDID) 4 MG tablet, Take 1 tablet (4 mg total) by mouth every 4 (four) hours as needed for Pain., Disp: 180 tablet, Rfl: 0    morphine (MS CONTIN) 30 MG 12 hr tablet, Take 2 tablets (60 mg total) by mouth 2 (two) times daily., Disp: 120 tablet, Rfl: 0    pantoprazole (PROTONIX) 40 MG tablet, Take 40 mg by mouth once daily., Disp: , Rfl: 3  Past Medical History:   Diagnosis Date    Blood transfusion     Cancer 2009    Cervical    Wears glasses        Clinical presentation:  Appearance:  The client presented in casual attire evidencing good grooming and hygiene    Neuro:  the client was alert, oriented x 4 and evidenced good judgement and insight.      Attention/concentration:  Was good in session    Memory:  The client had no subjective memory complaints and she was able to  recall information discussed in session accurately    Judgement:  behavior is adequate to the situation    Fund of knowledge:  intact and appropriate to age and level of education    Gait/station:  was normal    Heart: the patients heartbeat was regular in rate and rhythm.  Has mild MV regurg; trivial tricuspid regurg mild pulm valve regurg.  .    Lung: clear bilaterally, normal     Skin/Extremities:  warm and dry, no rashes/lesions/bruises or edema noted.. Nailbeds were pink and refill was good    Mood:  was mildly dysthymic.  No SI/HI/delusions/hallucinations/mood swings or expansive mood periods reported or suspected.     Affect: was normal range    Speech:  normal rate and tone     Sleep: the client has disrupted sleep due to pain complaints. Does not follow sleep hygiene strategies    Appetite: was reported as good, does skip meals  Has lost 34 lbs over the last 6 months.    Pain complaints:  none were voiced    ETOH/Substance use history:  The client had no reported ETOH/or other substance use problems     Psychosocial history:  The client currently lives with her 18yo son with [place].  She gave one child up at birth, one son , one lives with hts father (addiction history).  They reported having positive peer support and state they engage in leisure activies regularly.    Education/session discussion:  · Reviewed limits of confidentiality, missed appt/late show rules, need to arrive on time and expectation they will be an active participant in their care by asking questions, notifying staff of any medical problems or problems with medications. Advised client that medication refills are not usually made for 90 supplies and that appointments must be kept for refills to be authorized.  · Discussed the need for regular, restful sleep and strategies that help promote good sleep.  Reviewed the negative impact of alcohol, smoking, and caffeine on sleep with the client.  The client was given educations  information about sleep/insomnia and sleep hygiene for home reference.  · Discussed general issues about treatment of depression/anxiety/sleep including utility of medication and therapy. Discussed the risks/benefits/alternatives of medication with client.  Reviewed typical side effects and potential adverse reactions of an antidepressant medication including but not limited to teratogenicity, orthostatic changes, increased risk of SI, risk of mood swings, risk of electrolyte disturbance and increased risk of bleeding.  The client was given a handout about her medications (Cymbalta Trazodone)  for home reference. The client appeared to understand the information shared based on their questions and responses made in session.      Impression: Charleen has anxiety and depression that should respond to medication and therapy Prognosis good    Plan:  Start Cymbalta 30mg hs  Start Trazodone 50mg 1/2 to 1 tablet at bedtime  Stagger start dates  TSH T4 drugs of abuse screen  Refer to therapy  RTC 4 weeks and prn    Session:  4430-4212

## 2019-10-30 ENCOUNTER — TELEPHONE (OUTPATIENT)
Dept: PSYCHIATRY | Facility: CLINIC | Age: 54
End: 2019-10-30

## 2019-10-30 NOTE — TELEPHONE ENCOUNTER
Pt returned call to clinic regarding below message. Scheduled f/u appt. Pt states she also needs therapy appt. appt scheduled per referral. Appt date, time, and location given. Pt verbalized understanding with no further questions.

## 2019-11-04 ENCOUNTER — LAB VISIT (OUTPATIENT)
Dept: LAB | Facility: HOSPITAL | Age: 54
End: 2019-11-04
Attending: PSYCHOLOGIST
Payer: MEDICARE

## 2019-11-04 DIAGNOSIS — E78.2 MIXED HYPERLIPIDEMIA: ICD-10-CM

## 2019-11-04 DIAGNOSIS — F32.A ANXIETY AND DEPRESSION: ICD-10-CM

## 2019-11-04 DIAGNOSIS — F41.9 ANXIETY AND DEPRESSION: ICD-10-CM

## 2019-11-04 LAB
CHOLEST SERPL-MCNC: 142 MG/DL (ref 120–199)
CHOLEST/HDLC SERPL: 2.3 {RATIO} (ref 2–5)
HDLC SERPL-MCNC: 62 MG/DL (ref 40–75)
HDLC SERPL: 43.7 % (ref 20–50)
LDLC SERPL CALC-MCNC: 70 MG/DL (ref 63–159)
NONHDLC SERPL-MCNC: 80 MG/DL
T4 FREE SERPL-MCNC: 1.15 NG/DL (ref 0.71–1.51)
TRIGL SERPL-MCNC: 50 MG/DL (ref 30–150)
TSH SERPL DL<=0.005 MIU/L-ACNC: 0.84 UIU/ML (ref 0.4–4)

## 2019-11-04 PROCEDURE — 80307 DRUG TEST PRSMV CHEM ANLYZR: CPT

## 2019-11-04 PROCEDURE — 84439 ASSAY OF FREE THYROXINE: CPT

## 2019-11-04 PROCEDURE — 84443 ASSAY THYROID STIM HORMONE: CPT

## 2019-11-04 PROCEDURE — 80061 LIPID PANEL: CPT

## 2019-11-06 LAB
AMPHETAMINES SERPL QL: NEGATIVE
BARBITURATES SERPL QL SCN: NEGATIVE
BENZODIAZ SERPL QL SCN: NEGATIVE
BZE SERPL QL: NEGATIVE
CARBOXYTHC SERPL QL SCN: POSITIVE
ETHANOL SERPL QL SCN: NEGATIVE
METHADONE SERPL QL SCN: NEGATIVE
OPIATES SERPL QL SCN: POSITIVE
PCP SERPL QL SCN: NEGATIVE
PROPOXYPH SERPL QL: NEGATIVE

## 2019-12-03 ENCOUNTER — OFFICE VISIT (OUTPATIENT)
Dept: FAMILY MEDICINE | Facility: CLINIC | Age: 54
End: 2019-12-03
Payer: MEDICARE

## 2019-12-03 VITALS
HEART RATE: 80 BPM | SYSTOLIC BLOOD PRESSURE: 121 MMHG | WEIGHT: 115 LBS | DIASTOLIC BLOOD PRESSURE: 64 MMHG | BODY MASS INDEX: 20.38 KG/M2 | HEIGHT: 63 IN

## 2019-12-03 DIAGNOSIS — F41.1 GENERALIZED ANXIETY DISORDER: ICD-10-CM

## 2019-12-03 DIAGNOSIS — R10.33 PERIUMBILICAL ABDOMINAL PAIN: Primary | ICD-10-CM

## 2019-12-03 NOTE — PROGRESS NOTES
Subjective:       Patient ID: Charleen Louis is a 54 y.o. female.    Chief Complaint: Abdominal Pain    Patient complains of abdominal cramps and pains.  She recently had a CT scan of abdomen which was unremarkable except for some surgical clips from prior radical hysterectomy.  She had a barium enema which was unremarkable.  No cause of her abdominal pain or and stricture was defined on the CT scans.    Patient complains of abdominal cramps intermittently.  She would like her small bowels to be checked out.    I am not sure if she has an appointment with gastroenterologist or she has kept an appointment with gastroenterologist in past.    As I was discussing about various possibilities of abdominal pain and possible role of opiate medications she kept on frequently interrupting me and tended to be somewhat loud and labile.  She had to be frequently advised to let me finish my conversation.      Past Medical History:   Diagnosis Date    Blood transfusion     Cancer 2009    Cervical    Wears glasses      Social History     Socioeconomic History    Marital status: Single     Spouse name: Not on file    Number of children: 3    Years of education: Not on file    Highest education level: Not on file   Occupational History    Occupation: Disabled    Occupation: Ex /office   Social Needs    Financial resource strain: Not on file    Food insecurity:     Worry: Not on file     Inability: Not on file    Transportation needs:     Medical: Not on file     Non-medical: Not on file   Tobacco Use    Smoking status: Current Every Day Smoker     Packs/day: 0.50     Years: 25.00     Pack years: 12.50     Types: Cigarettes     Start date: 1/1/1982    Smokeless tobacco: Current User   Substance and Sexual Activity    Alcohol use: No     Comment: occ daiquiri    Drug use: No    Sexual activity: Not Currently   Lifestyle    Physical activity:     Days per week: Not on file     Minutes per session: Not on file     Stress: Very much   Relationships    Social connections:     Talks on phone: Not on file     Gets together: Not on file     Attends Scientologist service: Not on file     Active member of club or organization: Not on file     Attends meetings of clubs or organizations: Not on file     Relationship status: Not on file   Other Topics Concern    Not on file   Social History Narrative    17 Y Old with her at home    2 other grown and gone     Past Surgical History:   Procedure Laterality Date    CHOLECYSTECTOMY      HYSTERECTOMY      BSO    Port-a-Cath placement      some of intestines removed       History reviewed. No pertinent family history.    Review of Systems   Constitutional: Positive for fatigue. Negative for activity change, chills, fever and unexpected weight change (loss 1 lb).   HENT: Negative for congestion, postnasal drip and sinus pressure.    Eyes: Negative for pain, discharge and visual disturbance.   Respiratory: Negative for cough, chest tightness and shortness of breath.    Cardiovascular: Negative for chest pain, palpitations and leg swelling.   Gastrointestinal: Positive for abdominal pain and diarrhea (stable with opaites). Negative for abdominal distention, anal bleeding and constipation.        Resection of colon - radiation damage and adhesions .   Genitourinary: Negative for difficulty urinating, dysuria, flank pain, frequency, menstrual problem and pelvic pain.        H/O Cervical Cancer 2009- Radical hysterectomy  [place] followed by RT and Chemo  30 weeks of external radiation and internal implants  Metastases   Musculoskeletal: Positive for back pain. Negative for arthralgias and joint swelling.        Back pain - herniated discs Blames radiation for it    Skin: Negative for color change, pallor and rash.   Allergic/Immunologic: Negative for environmental allergies, food allergies and immunocompromised state.   Neurological: Negative for dizziness, tremors, seizures and headaches.  "  Hematological: Negative for adenopathy. Does not bruise/bleed easily.   Psychiatric/Behavioral: Positive for behavioral problems and sleep disturbance. Negative for agitation, confusion and dysphoric mood. The patient is nervous/anxious.          Objective:      Blood pressure 121/64, pulse 80, height 5' 3" (1.6 m), weight 52.2 kg (115 lb). Body mass index is 20.37 kg/m².  Physical Exam   Eyes: Right pupil is round and reactive. Left pupil is round and reactive.   Psychiatric: Her mood appears anxious. Her speech is rapid and/or pressured.   Frequently interruptive.   Nursing note and vitals reviewed.        Assessment:       1. Periumbilical abdominal pain    2. Generalized anxiety disorder           Lab Visit on 11/04/2019   Component Date Value Ref Range Status    Cholesterol 11/04/2019 142  120 - 199 mg/dL Final    Triglycerides 11/04/2019 50  30 - 150 mg/dL Final    HDL 11/04/2019 62  40 - 75 mg/dL Final    LDL Cholesterol 11/04/2019 70.0  63.0 - 159.0 mg/dL Final    Hdl/Cholesterol Ratio 11/04/2019 43.7  20.0 - 50.0 % Final    Total Cholesterol/HDL Ratio 11/04/2019 2.3  2.0 - 5.0 Final    Non-HDL Cholesterol 11/04/2019 80  mg/dL Final    TSH 11/04/2019 0.840  0.400 - 4.000 uIU/mL Final    Free T4 11/04/2019 1.15  0.71 - 1.51 ng/dL Final    Amphetamine Scrn 11/04/2019 Negative   Final    Barbiturate Scrn 11/04/2019 Negative   Final    Benzodiazepines 11/04/2019 Negative   Final    Cocaine Lvl 11/04/2019 Negative   Final    Alcohol Scrn 11/04/2019 Negative   Final    Methadone (Dolophine), Serum 11/04/2019 Negative   Final    Opiates, Blood 11/04/2019 Positive   Final    Phencyclidine, Blood 11/04/2019 Negative   Final    Propoxyphene 11/04/2019 Negative   Final    THC (Marijuana) Metabolite, bl 11/04/2019 Positive   Final         Plan:           Periumbilical abdominal pain    Generalized anxiety disorder    Other orders  -     Cancel: Influenza - Quadrivalent (PF)        After getting " initial conversation and discussing the various possibilities of abdominal pain, cramps and discomfort I had raised possibilities of opiate medications having a role in contribution of these.  Patient tended to be frequently interrupt live and I advised her to listen to me and not interrupt me frequently.      It was felt by the patient that I was not attending to her needs and she left the clinic thereafter.    If patient continues to be labile and interruptive - it will lead to problematic care for her.    Today's examination is incomplete and this will not be billed.        Current Outpatient Medications:     DULoxetine (CYMBALTA) 30 MG capsule, Take 1 tablet at bedtime each night, Disp: 30 capsule, Rfl: 1    HYDROmorphone (DILAUDID) 4 MG tablet, Take 1 tablet (4 mg total) by mouth every 4 (four) hours as needed for Pain., Disp: 180 tablet, Rfl: 0    morphine (MS CONTIN) 30 MG 12 hr tablet, Take 2 tablets (60 mg total) by mouth 2 (two) times daily., Disp: 120 tablet, Rfl: 0    pantoprazole (PROTONIX) 40 MG tablet, Take 40 mg by mouth once daily., Disp: , Rfl: 3    traZODone (DESYREL) 50 MG tablet, Take 1/2 to 1 tablet at bestime for for sleep, Disp: 30 tablet, Rfl: 1

## 2019-12-05 ENCOUNTER — OFFICE VISIT (OUTPATIENT)
Dept: PSYCHIATRY | Facility: CLINIC | Age: 54
End: 2019-12-05
Payer: MEDICARE

## 2019-12-05 DIAGNOSIS — F43.21 GRIEF AT LOSS OF CHILD: ICD-10-CM

## 2019-12-05 DIAGNOSIS — Z63.4 GRIEF AT LOSS OF CHILD: ICD-10-CM

## 2019-12-05 PROCEDURE — 99999 PR PBB SHADOW E&M-EST. PATIENT-LVL II: CPT | Mod: PBBFAC,,, | Performed by: SOCIAL WORKER

## 2019-12-05 PROCEDURE — 99999 PR PBB SHADOW E&M-EST. PATIENT-LVL II: ICD-10-PCS | Mod: PBBFAC,,, | Performed by: SOCIAL WORKER

## 2019-12-05 PROCEDURE — 90791 PR PSYCHIATRIC DIAGNOSTIC EVALUATION: ICD-10-PCS | Mod: S$GLB,,, | Performed by: SOCIAL WORKER

## 2019-12-05 PROCEDURE — 90791 PSYCH DIAGNOSTIC EVALUATION: CPT | Mod: S$GLB,,, | Performed by: SOCIAL WORKER

## 2019-12-05 SDOH — SOCIAL DETERMINANTS OF HEALTH (SDOH): DISSAPEARANCE AND DEATH OF FAMILY MEMBER: Z63.4

## 2019-12-05 NOTE — PROGRESS NOTES
"Psychiatry Initial Visit (PhD/LCSW)  Diagnostic Interview - CPT 13401    Date: 2019    Site: [place] [id number] - PSYCHIATRY  [facility] [place]    Referral source: Jaylene Sanz MP    Clinical status of patient: Outpatient    Santi Louis, a 54 y.o. female, for initial evaluation visit.  Met with patient.    Chief complaint/reason for encounter: depression and anxiety    History of present illness: Reviewed chart. Met with Santi and determined needs for therapy.  Son Francisco Javier  suddenly 2019 (lived in [place]). Son Dell (30) an addict in [facility]. Son Rohit (17) lives here with her (he has Asberger's Syndrome). Ex- Steven (Rohit's father) also lives here with his wife. Shared custody of Rohit.  Jeannine has Hx of cervical cancer and now has GI issues that have not been determined.  Daily diarrhea, gas, stomach pain. Has seen a GI MD and some tests have returned normal but no further testing has been ordered. Would like a 2nd opinion.  Intends to discuss the issue with her Oncologist Dr Yao.  Yesterday pt saw Dr Tomlinson PCP and stated she was humiliated by him regarding her medication, past substance abuse issues and (her assumption) that he felt she was "drug-seeking".  Stated she left his office after he raised his voice with her because she had interrupted him.  Santi stated she has a past addiction Hx and had a relapse in . She has participated in rehab and has had past therapy (individual and family).  Today, she requested help with her grief and family issues that have arisen due to her ex- Steven paying for her addict son Dell to come to [place] and live with him without consulting Santi. Apparently there has been "a whole lot of drama, anger, verbal abuse and bullying of Rohit by Dell". Keyana Alford stated she has stopped Steven's visitation with Rohit because he was frightened by Dell and now Steven has dropped Dell back at airport to return to " [place]. Keyana Alford does not know if Dell actually got on the plane. Now Keyana Alford doesn't trust Steven and his wife and just wants a break from all of it.  Denies SI/HI. Denies any substance abuse issues since the relapse in 2013. No alcohol use.  Reports sleep has improved after Dr Sanz prescribed medication. Return bi-weekly as schedule permits.    REUBEN 7= 20  PHQ 9 = 13      Pain: noncontributory    Symptoms:   · Mood: depressed mood and weight loss  · Anxiety: excessive anxiety/worry  · Substance abuse: denied  · Cognitive functioning: denied  · Health behaviors: noncontributory    Psychiatric history: currently under psychiatric care     Medical history:   Past Medical History:   Diagnosis Date    Blood transfusion     Cancer 2009    Cervical    Wears glasses        Family history of psychiatric illness: History reviewed. No pertinent family history.    Social history (marriage, employment, etc.):   Social History     Tobacco Use    Smoking status: Current Every Day Smoker     Packs/day: 0.50     Years: 25.00     Pack years: 12.50     Types: Cigarettes     Start date: 1/1/1982    Smokeless tobacco: Current User   Substance Use Topics    Alcohol use: No     Comment: occ daiquiri    Drug use: No       Current medications and drug reactions (include OTC, herbal): see medication list     Strengths and liabilities: Strength: Patient is expressive/articulate., Strength: Patient has reasonable judgment., Liability: Patient lacks coping skills.    Current Evaluation:     Mental Status Exam:  General Appearance:  unremarkable, age appropriate, casually dressed   Speech: normal tone, normal rate, normal pitch, normal volume      Level of Cooperation: cooperative      Thought Processes: normal and logical   Mood: anxious, sad      Thought Content: normal, no suicidality, no homicidality, delusions, or paranoia   Affect: sad, anxious   Orientation: Oriented x3   Memory: recent >  intact   Attention Span &  Concentration: intact   Fund of General Knowledge: intact and appropriate to age and level of education   Abstract Reasoning: untested   Judgment & Insight: fair     Language  intact     Diagnostic Impression - Plan:     No diagnosis found.    Plan:individual psychotherapy Pt to go to ED or call 911 if symptoms worsen or if she has thoughts of harming self and/or others. Pt verbalized understanding.    Return to Clinic: 2 weeks    Length of Service (minutes): 45

## 2019-12-20 ENCOUNTER — OFFICE VISIT (OUTPATIENT)
Dept: PSYCHIATRY | Facility: CLINIC | Age: 54
End: 2019-12-20
Payer: MEDICARE

## 2019-12-20 VITALS
WEIGHT: 115.31 LBS | HEART RATE: 61 BPM | BODY MASS INDEX: 20.42 KG/M2 | SYSTOLIC BLOOD PRESSURE: 135 MMHG | DIASTOLIC BLOOD PRESSURE: 79 MMHG

## 2019-12-20 DIAGNOSIS — F32.A ANXIETY AND DEPRESSION: Primary | ICD-10-CM

## 2019-12-20 DIAGNOSIS — F41.9 ANXIETY AND DEPRESSION: Primary | ICD-10-CM

## 2019-12-20 PROCEDURE — 3008F PR BODY MASS INDEX (BMI) DOCUMENTED: ICD-10-PCS | Mod: CPTII,S$GLB,, | Performed by: PSYCHOLOGIST

## 2019-12-20 PROCEDURE — 99999 PR PBB SHADOW E&M-EST. PATIENT-LVL II: ICD-10-PCS | Mod: PBBFAC,,, | Performed by: PSYCHOLOGIST

## 2019-12-20 PROCEDURE — 3008F BODY MASS INDEX DOCD: CPT | Mod: CPTII,S$GLB,, | Performed by: PSYCHOLOGIST

## 2019-12-20 PROCEDURE — 99999 PR PBB SHADOW E&M-EST. PATIENT-LVL II: CPT | Mod: PBBFAC,,, | Performed by: PSYCHOLOGIST

## 2019-12-20 PROCEDURE — 99214 OFFICE O/P EST MOD 30 MIN: CPT | Mod: S$GLB,,, | Performed by: PSYCHOLOGIST

## 2019-12-20 PROCEDURE — 99214 PR OFFICE/OUTPT VISIT, EST, LEVL IV, 30-39 MIN: ICD-10-PCS | Mod: S$GLB,,, | Performed by: PSYCHOLOGIST

## 2019-12-20 RX ORDER — TRAZODONE HYDROCHLORIDE 50 MG/1
TABLET ORAL
Qty: 30 TABLET | Refills: 1 | Status: SHIPPED | OUTPATIENT
Start: 2019-12-20 | End: 2020-02-10 | Stop reason: SDUPTHER

## 2019-12-20 RX ORDER — DULOXETIN HYDROCHLORIDE 60 MG/1
CAPSULE, DELAYED RELEASE ORAL
Qty: 30 CAPSULE | Refills: 2 | Status: SHIPPED | OUTPATIENT
Start: 2019-12-20 | End: 2020-03-06 | Stop reason: SDUPTHER

## 2019-12-20 NOTE — PROGRESS NOTES
Client: Charleen Louis  : 1965  Suhail Tomlinson MD  [id number]    Presenting complaint:/Past psychiatric treatment: Charleen presented with a history of recurrent depression and anxiety that has escalated this year.  She has an adult son with a substance use problem.  She also has a remote history of substance abuse with her last use reported as  when she had a relapse.  One son also  in July and the son living with her has Asbergers.  Today she presented with an improved mood, cont pain but she is now sleeping well with Trazodone. She is handling things much better and her one son's has noticed her improvement.    On the PHQ9  her score today was 7 [24]  On the  GAD7  4 [17].  She denied SI/HI/delusions/halluciantions/mood swings and expansive mood periods. She reported no ease of frustration. 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  She denied any other psychiatric inpatient care.  Stressors: death of 31yo son in July, another son   Has Asbergers and one adult son  in .      Family psychiatric history: none reported  Relevant lab reviewed  CMP CBC normal  Relevant EKG reviewed  ST st abnormalities    Review of patient's allergies indicates:  No Known Allergies    Current Outpatient Medications:     DULoxetine (CYMBALTA) 30 MG capsule, Take 1 tablet at bedtime each night, Disp: 30 capsule, Rfl: 1    HYDROmorphone (DILAUDID) 4 MG tablet, Take 1 tablet (4 mg total) by mouth every 4 (four) hours as needed for Pain., Disp: 180 tablet, Rfl: 0    morphine (MS CONTIN) 30 MG 12 hr tablet, Take 2 tablets (60 mg total) by mouth 2 (two) times daily., Disp: 120 tablet, Rfl: 0    pantoprazole (PROTONIX) 40 MG tablet, Take 40 mg by mouth once daily., Disp: , Rfl: 3    traZODone (DESYREL) 50 MG tablet, Take 1/2 to 1 tablet at bestime for for sleep, Disp: 30 tablet, Rfl: 1  Past Medical History:   Diagnosis Date    Blood transfusion     Cancer  2009    Cervical    Wears glasses        Clinical presentation:  Appearance:  The client presented in casual attire evidencing good grooming and hygiene    Neuro:  the client was alert, oriented x 4 and evidenced good judgement and insight.      Attention/concentration:  Was good in session    Memory:  The client had no subjective memory complaints and she was able to recall information discussed in session accurately    Judgement:  behavior is adequate to the situation    Fund of knowledge:  intact and appropriate to age and level of education    Gait/station:  was normal    Heart: the patients heartbeat was regular in rate and rhythm.  Has mild MV regurg; trivial tricuspid regurg mild pulm valve regurg.  .    Lung: clear bilaterally, normal     Skin/Extremities:  warm and dry, no rashes/lesions/bruises or edema noted..     Mood:  was euthymic.  No SI/HI/delusions/hallucinations/mood swings or expansive mood periods reported or suspected.     Affect: was normal range    Speech:  normal rate and tone     Sleep: the client has disrupted sleep due to pain complaints. Does not follow sleep hygiene strategies    Appetite: was reported as good, does skip meals  Has lost 34 lbs over the last 6 months.    Pain complaints:  occ joint and muscle pain    ETOH/Substance use history:  The client had no reported ETOH/or other substance use problems     Psychosocial history:  The client currently lives with her 16yo son with [place].  She gave one child up at birth, one son , one lives with hts father (addiction history).  They reported having positive peer support and state they engage in leisure activies regularly.    Education/session discussion:  · Reviewed sleep hygiene and need to go to bed later so as not to awaken at 3am.   · Reviewed general issues about treatment of depression/anxiety/sleep including utility of medication and therapy. Discussed the risks/benefits/alternatives of medication with client.  Reviewed  typical side effects and potential adverse reactions of an antidepressant medication including but not limited to teratogenicity, orthostatic changes, increased risk of SI, risk of mood swings, risk of electrolyte disturbance and increased risk of bleeding.  The client appeared to understand the information shared based on their questions and responses made in session.      Impression: Charleen has anxiety and depression that should respond to medication and therapy Prognosis good    Plan:  Increase Cymbalta 60mg hs  Cont Trazodone 50mg 1 tablet at bedtime  RTC 8 weeks and prn    Session:  [id number]

## 2020-01-02 ENCOUNTER — OFFICE VISIT (OUTPATIENT)
Dept: PSYCHIATRY | Facility: CLINIC | Age: 55
End: 2020-01-02
Payer: MEDICARE

## 2020-01-02 DIAGNOSIS — F43.21 GRIEF AT LOSS OF CHILD: Primary | ICD-10-CM

## 2020-01-02 DIAGNOSIS — Z63.4 GRIEF AT LOSS OF CHILD: Primary | ICD-10-CM

## 2020-01-02 PROCEDURE — 99999 PR PBB SHADOW E&M-EST. PATIENT-LVL II: ICD-10-PCS | Mod: PBBFAC,,, | Performed by: SOCIAL WORKER

## 2020-01-02 PROCEDURE — 99999 PR PBB SHADOW E&M-EST. PATIENT-LVL II: CPT | Mod: PBBFAC,,, | Performed by: SOCIAL WORKER

## 2020-01-02 PROCEDURE — 90834 PSYTX W PT 45 MINUTES: CPT | Mod: S$GLB,,, | Performed by: SOCIAL WORKER

## 2020-01-02 PROCEDURE — 90834 PR PSYCHOTHERAPY W/PATIENT, 45 MIN: ICD-10-PCS | Mod: S$GLB,,, | Performed by: SOCIAL WORKER

## 2020-01-02 SDOH — SOCIAL DETERMINANTS OF HEALTH (SDOH): DISSAPEARANCE AND DEATH OF FAMILY MEMBER: Z63.4

## 2020-01-02 NOTE — PROGRESS NOTES
"ndividual Psychotherapy (PhD/LCSW)    1/2/2020    Site:  [place] [id number] - PSYCHIATRY  [facility] [place]          Therapeutic Intervention: Met with patient.  Outpatient - Supportive psychotherapy 45 min - CPT Code 91473 and Outpatient - Interactive psychotherapy 45 min - CPT code 46721    Chief complaint/reason for encounter: depression and anxiety     Interval history and content of current session: Reviewed chart. Met with Charleen and reviewed progress.  Just returned from [place] with son Rohit (17).  Grief reaction still strong, some tears but tends to focus on rohit's needs more than her own.  Nikolai has Asperger's and patrick is trying to develop Independent Living Skills.  See Bradley Lewis at [facility].  Encouraged speaking to him about a social skills group I am aware of at [facility] with Mary Martinez LCSW.   Other son, Dell, returned to [place] but has been sending nasty text messages so Patrick feels it's best to cut off communication with him at this time. She reports he is actively using drugs.  Discussed her own socialization and her goal is to find something to get her out of the house, with animals or people.  Tearful when discussing her grandchildren (3, 5) and her d-i-l who apparently, after insurance policy settled, has "several boyfriends".  Encouraged limiting her thoughts and releasing need for control regarding her behavior.  Scheduled to return in 2 weeks.    Treatment plan:  · Target symptoms: depression, grief  · Why chosen therapy is appropriate versus another modality: relevant to diagnosis, patient responds to this modality, evidence based practice  · Outcome monitoring methods: self-report, observation  · Therapeutic intervention type: insight oriented psychotherapy, supportive psychotherapy    Risk parameters:  Patient reports no suicidal ideation  Patient reports no homicidal ideation  Patient reports no self-injurious behavior  Patient reports no " violent behavior    Verbal deficits: None    Patient's response to intervention:  The patient's response to intervention is accepting.    Progress toward goals and other mental status changes:  The patient's progress toward goals is fair .    Diagnosis:   1. Grief at loss of child        Plan:  individual psychotherapy Pt to go to ED or call 911 if symptoms worsen or if she has thoughts of harming self and/or others. Pt verbalized understanding.    Return to clinic: 2 weeks    Length of Service (minutes): 45 minutes

## 2020-01-17 ENCOUNTER — LAB VISIT (OUTPATIENT)
Dept: LAB | Facility: HOSPITAL | Age: 55
End: 2020-01-17
Attending: INTERNAL MEDICINE
Payer: MEDICARE

## 2020-01-17 DIAGNOSIS — Z85.41 HISTORY OF CERVICAL CANCER: ICD-10-CM

## 2020-01-17 LAB
ALBUMIN SERPL BCP-MCNC: 4.3 G/DL (ref 3.5–5.2)
ALP SERPL-CCNC: 71 U/L (ref 38–145)
ALT SERPL W/O P-5'-P-CCNC: <6 U/L (ref 10–44)
ANION GAP SERPL CALC-SCNC: 10 MMOL/L (ref 8–16)
AST SERPL-CCNC: 22 U/L (ref 14–36)
BASOPHILS # BLD AUTO: 0.04 K/UL (ref 0–0.2)
BASOPHILS NFR BLD: 0.6 % (ref 0–1.9)
BILIRUB SERPL-MCNC: 0.3 MG/DL (ref 0.2–1.3)
BUN SERPL-MCNC: 17 MG/DL (ref 7–18)
CALCIUM SERPL-MCNC: 9.6 MG/DL (ref 8.4–10.2)
CHLORIDE SERPL-SCNC: 104 MMOL/L (ref 95–110)
CO2 SERPL-SCNC: 25 MMOL/L (ref 22–31)
CREAT SERPL-MCNC: 0.73 MG/DL (ref 0.5–1.4)
DIFFERENTIAL METHOD: NORMAL
EOSINOPHIL # BLD AUTO: 0.1 K/UL (ref 0–0.5)
EOSINOPHIL NFR BLD: 2.2 % (ref 0–8)
ERYTHROCYTE [DISTWIDTH] IN BLOOD BY AUTOMATED COUNT: 13.4 % (ref 11.5–14.5)
EST. GFR  (AFRICAN AMERICAN): >60 ML/MIN/1.73 M^2
EST. GFR  (NON AFRICAN AMERICAN): >60 ML/MIN/1.73 M^2
GLUCOSE SERPL-MCNC: 98 MG/DL (ref 70–110)
HCT VFR BLD AUTO: 40 % (ref 37–48.5)
HGB BLD-MCNC: 12.9 G/DL (ref 12–16)
IMM GRANULOCYTES # BLD AUTO: 0.01 K/UL (ref 0–0.04)
IMM GRANULOCYTES NFR BLD AUTO: 0.2 % (ref 0–0.5)
LYMPHOCYTES # BLD AUTO: 1.6 K/UL (ref 1–4.8)
LYMPHOCYTES NFR BLD: 25.7 % (ref 18–48)
MCH RBC QN AUTO: 29.4 PG (ref 27–31)
MCHC RBC AUTO-ENTMCNC: 32.3 G/DL (ref 32–36)
MCV RBC AUTO: 91 FL (ref 82–98)
MONOCYTES # BLD AUTO: 0.4 K/UL (ref 0.3–1)
MONOCYTES NFR BLD: 6.5 % (ref 4–15)
NEUTROPHILS # BLD AUTO: 4.1 K/UL (ref 1.8–7.7)
NEUTROPHILS NFR BLD: 64.8 % (ref 38–73)
NRBC BLD-RTO: 0 /100 WBC
PLATELET # BLD AUTO: 208 K/UL (ref 150–350)
PMV BLD AUTO: 10 FL (ref 9.2–12.9)
POTASSIUM SERPL-SCNC: 4.1 MMOL/L (ref 3.5–5.1)
PROT SERPL-MCNC: 7.8 G/DL (ref 6–8.4)
RBC # BLD AUTO: 4.39 M/UL (ref 4–5.4)
SODIUM SERPL-SCNC: 139 MMOL/L (ref 136–145)
WBC # BLD AUTO: 6.26 K/UL (ref 3.9–12.7)

## 2020-01-17 PROCEDURE — 80053 COMPREHEN METABOLIC PANEL: CPT

## 2020-01-17 PROCEDURE — 85025 COMPLETE CBC W/AUTO DIFF WBC: CPT

## 2020-01-17 PROCEDURE — 85025 COMPLETE CBC W/AUTO DIFF WBC: CPT | Mod: PN

## 2020-01-17 PROCEDURE — 36415 COLL VENOUS BLD VENIPUNCTURE: CPT | Mod: PN

## 2020-01-17 PROCEDURE — 80053 COMPREHEN METABOLIC PANEL: CPT | Mod: PN

## 2020-01-29 ENCOUNTER — OFFICE VISIT (OUTPATIENT)
Dept: PSYCHIATRY | Facility: CLINIC | Age: 55
End: 2020-01-29
Payer: MEDICARE

## 2020-01-29 DIAGNOSIS — F43.21 GRIEF AT LOSS OF CHILD: Primary | ICD-10-CM

## 2020-01-29 DIAGNOSIS — Z63.4 GRIEF AT LOSS OF CHILD: Primary | ICD-10-CM

## 2020-01-29 PROCEDURE — 90834 PR PSYCHOTHERAPY W/PATIENT, 45 MIN: ICD-10-PCS | Mod: S$GLB,,, | Performed by: SOCIAL WORKER

## 2020-01-29 PROCEDURE — 99999 PR PBB SHADOW E&M-EST. PATIENT-LVL II: CPT | Mod: PBBFAC,,, | Performed by: SOCIAL WORKER

## 2020-01-29 PROCEDURE — 90834 PSYTX W PT 45 MINUTES: CPT | Mod: S$GLB,,, | Performed by: SOCIAL WORKER

## 2020-01-29 PROCEDURE — 99999 PR PBB SHADOW E&M-EST. PATIENT-LVL II: ICD-10-PCS | Mod: PBBFAC,,, | Performed by: SOCIAL WORKER

## 2020-01-29 SDOH — SOCIAL DETERMINANTS OF HEALTH (SDOH): DISSAPEARANCE AND DEATH OF FAMILY MEMBER: Z63.4

## 2020-01-29 NOTE — PROGRESS NOTES
Individual Psychotherapy (PhD/LCSW)    1/29/2020    Site:  [place] [id number] - PSYCHIATRY  [facility] [place]          Therapeutic Intervention: Met with patient.  Outpatient - Supportive psychotherapy 45 min - CPT Code 96287 and Outpatient - Interactive psychotherapy 45 min - CPT code 01611    Chief complaint/reason for encounter: depression, anger and anxiety     Interval history and content of current session: Reviewed chart. Met with Keyana and reviewed progress.  Health issues remain a stressor-stomach issues, constant diarrhea, inability to eat.  Spoke with Dr Yao at last appt and has received a referral to a new sveta MD.  Grief work and relationship with son Dell continues to be a struggle. Grieving Francisco Javier' loss without support saddens her and she feels isolated and alone.  Her youngest son Rohit's father Zia and she remain estranged after episode with Dell and she is still getting angry vicious texts from Dell. Keyana feels lonely and a discussion about having the opportunity to pick and choose her own family for she and Rohit can be positive. Spending time writing will help with her hyper verbal, lack of boundaries history.  She recognizes the need to changer her behavior.  Encouraged therapeutic letter writing to all parties-even a goodbye letter to Francisco Javier.  Paying attention to organizing and processing her thoughts and feelings.  Return as scheduled.    Treatment plan:  · Target symptoms: depression, anxiety , grief  · Why chosen therapy is appropriate versus another modality: relevant to diagnosis, patient responds to this modality, evidence based practice  · Outcome monitoring methods: self-report, observation  · Therapeutic intervention type: insight oriented psychotherapy, behavior modifying psychotherapy, supportive psychotherapy    Risk parameters:  Patient reports no suicidal ideation  Patient reports no homicidal ideation  Patient reports no self-injurious  behavior  Patient reports no violent behavior    Verbal deficits: None    Patient's response to intervention:  The patient's response to intervention is accepting.    Progress toward goals and other mental status changes:  The patient's progress toward goals is fair .    Diagnosis:   1. Grief at loss of child        Plan:  individual psychotherapy Pt to go to ED or call 911 if symptoms worsen or if she has thoughts of harming self and/or others. Pt verbalized understanding.    Return to clinic: 2 weeks    Length of Service (minutes): 45 minutes

## 2020-02-10 RX ORDER — TRAZODONE HYDROCHLORIDE 50 MG/1
TABLET ORAL
Qty: 30 TABLET | Refills: 0 | Status: SHIPPED | OUTPATIENT
Start: 2020-02-10 | End: 2020-03-06 | Stop reason: SDUPTHER

## 2020-02-10 NOTE — TELEPHONE ENCOUNTER
Pt is requesting medication refill on Trazodone 50 mg   Last refill: 12/20/19  Last visit: 12/20/19  Follow Up: 2/25/20

## 2020-02-12 ENCOUNTER — OFFICE VISIT (OUTPATIENT)
Dept: PSYCHIATRY | Facility: CLINIC | Age: 55
End: 2020-02-12
Payer: MEDICARE

## 2020-02-12 DIAGNOSIS — F43.21 GRIEF AT LOSS OF CHILD: Primary | ICD-10-CM

## 2020-02-12 DIAGNOSIS — Z63.4 GRIEF AT LOSS OF CHILD: Primary | ICD-10-CM

## 2020-02-12 PROCEDURE — 90834 PSYTX W PT 45 MINUTES: CPT | Mod: S$GLB,,, | Performed by: SOCIAL WORKER

## 2020-02-12 PROCEDURE — 90834 PR PSYCHOTHERAPY W/PATIENT, 45 MIN: ICD-10-PCS | Mod: S$GLB,,, | Performed by: SOCIAL WORKER

## 2020-02-12 PROCEDURE — 99999 PR PBB SHADOW E&M-EST. PATIENT-LVL II: ICD-10-PCS | Mod: PBBFAC,,, | Performed by: SOCIAL WORKER

## 2020-02-12 PROCEDURE — 99999 PR PBB SHADOW E&M-EST. PATIENT-LVL II: CPT | Mod: PBBFAC,,, | Performed by: SOCIAL WORKER

## 2020-02-12 SDOH — SOCIAL DETERMINANTS OF HEALTH (SDOH): DISSAPEARANCE AND DEATH OF FAMILY MEMBER: Z63.4

## 2020-03-06 ENCOUNTER — OFFICE VISIT (OUTPATIENT)
Dept: PSYCHIATRY | Facility: CLINIC | Age: 55
End: 2020-03-06
Payer: MEDICARE

## 2020-03-06 VITALS
BODY MASS INDEX: 20.08 KG/M2 | HEART RATE: 77 BPM | SYSTOLIC BLOOD PRESSURE: 132 MMHG | DIASTOLIC BLOOD PRESSURE: 77 MMHG | WEIGHT: 113.31 LBS | TEMPERATURE: 97 F | HEIGHT: 63 IN

## 2020-03-06 DIAGNOSIS — F41.9 ANXIETY AND DEPRESSION: Primary | ICD-10-CM

## 2020-03-06 DIAGNOSIS — F32.A ANXIETY AND DEPRESSION: Primary | ICD-10-CM

## 2020-03-06 PROCEDURE — 99214 OFFICE O/P EST MOD 30 MIN: CPT | Mod: S$GLB,,, | Performed by: PSYCHOLOGIST

## 2020-03-06 PROCEDURE — 99999 PR PBB SHADOW E&M-EST. PATIENT-LVL III: CPT | Mod: PBBFAC,,, | Performed by: PSYCHOLOGIST

## 2020-03-06 PROCEDURE — 99999 PR PBB SHADOW E&M-EST. PATIENT-LVL III: ICD-10-PCS | Mod: PBBFAC,,, | Performed by: PSYCHOLOGIST

## 2020-03-06 PROCEDURE — 3008F PR BODY MASS INDEX (BMI) DOCUMENTED: ICD-10-PCS | Mod: CPTII,S$GLB,, | Performed by: PSYCHOLOGIST

## 2020-03-06 PROCEDURE — 3008F BODY MASS INDEX DOCD: CPT | Mod: CPTII,S$GLB,, | Performed by: PSYCHOLOGIST

## 2020-03-06 PROCEDURE — 99214 PR OFFICE/OUTPT VISIT, EST, LEVL IV, 30-39 MIN: ICD-10-PCS | Mod: S$GLB,,, | Performed by: PSYCHOLOGIST

## 2020-03-06 RX ORDER — DULOXETIN HYDROCHLORIDE 60 MG/1
CAPSULE, DELAYED RELEASE ORAL
Qty: 30 CAPSULE | Refills: 3 | Status: SHIPPED | OUTPATIENT
Start: 2020-03-06 | End: 2020-06-05 | Stop reason: SDUPTHER

## 2020-03-06 RX ORDER — TRAZODONE HYDROCHLORIDE 50 MG/1
TABLET ORAL
Qty: 30 TABLET | Refills: 3 | Status: SHIPPED | OUTPATIENT
Start: 2020-03-06 | End: 2020-06-05 | Stop reason: SDUPTHER

## 2020-03-06 NOTE — PROGRESS NOTES
Client: Charleen Louis  : 1965  Suhail Tomlinson MD  [id number]    Presenting complaint:/Past psychiatric treatment: Charleen presented with a history of recurrent depression and anxiety that has escalated this year.  She was started on Cymbalta 60mg hs and has had a positive response to that medication. She reported she has not cried out of the blue and that she is feeling good emotionally again.  She is upset at her medical care and sts she has fired Dr Tomlinson and currently is without a primary care provider.   She sts she will establish care with [facility].  She is also in therapy.  Charleen is also in therapy.  She is looking forward to seeing her son graduate from high school May this year.  She has an adult son with a substance use problem. One son also  in July.  Today she presented with an improved mood, cont pain but she is now sleeping well with Trazodone. She is handling things much better and her one son's has noticed her improvement.      On the PHQ9  her score today was 6  [7]  On the  GAD7  5 [4].  She denied SI/HI/delusions/hallucinations/mood swings and expansive mood periods. She reported no ease of frustration. 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  She denied any other psychiatric inpatient care.  Stressors: health complaints, death of 29yo son in July, another son   Has Asperger and one adult son  in .      Family psychiatric history: none reported  Relevant lab reviewed     ALT<6 CBC normal   Relevant EKG reviewed  ST st abnormalities    Review of patient's allergies indicates:  No Known Allergies    Current Outpatient Medications:     DULoxetine (CYMBALTA) 60 MG capsule, Take 1 tablet at bedtime each night, Disp: 30 capsule, Rfl: 2    HYDROmorphone (DILAUDID) 4 MG tablet, Take 1 tablet (4 mg total) by mouth every 4 (four) hours as needed for Pain., Disp: 180 tablet, Rfl: 0    morphine (MS CONTIN) 30 MG 12 hr tablet, Take 2  tablets (60 mg total) by mouth 2 (two) times daily., Disp: 120 tablet, Rfl: 0    pantoprazole (PROTONIX) 40 MG tablet, Take 40 mg by mouth once daily., Disp: , Rfl: 3    traZODone (DESYREL) 50 MG tablet, Take 1/2 to 1 tablet at bestime for for sleep, Disp: 30 tablet, Rfl: 0  Past Medical History:   Diagnosis Date    Blood transfusion     Cancer 2009    Cervical    Wears glasses      Clinical presentation:  Appearance:  The client presented in casual attire evidencing good grooming and hygiene    Neuro:  the client was alert, oriented x 4 and evidenced good judgement and insight.      Attention/concentration:  Was good in session    Memory:  The client had no subjective memory complaints and she was able to recall information discussed in session accurately    Judgement:  behavior is adequate to the situation    Fund of knowledge:  intact and appropriate to age and level of education    Gait/station:  was normal    Heart/lungs: no cardiac symptoms/normal effort     Skin/Extremities:  warm and dry, no rashes/lesions/bruises or edema noted..     Mood:  was euthymic.  No SI/HI/delusions/hallucinations/mood swings or expansive mood periods reported or suspected.     Affect: was normal range    Speech:  normal rate and tone     Sleep: the client has been sleeping well with Trazodone. Wakes up to use the restroom.     Appetite: was reported as good, does skip meals but drinks Boost. She has lost weight since the last visit.     Pain complaints: Intermittent abdominal pain, not in any distress.Occ joint and muscle pain    ETOH/Substance use history:  The client had no reported ETOH/or other substance use problems     Psychosocial history:  The client currently lives with her 16yo son with Asperger.  She gave one child up at birth, one son , one lives with his father (addiction history).  They reported having positive peer support and state they engage in leisure activities regularly.    Education/session  discussion:  · Reviewed general issues about treatment of depression/anxiety/sleep including utility of medication and therapy. Discussed the risks/benefits/alternatives of medication with client.  Reviewed typical side effects and potential adverse reactions of an antidepressant medication including but not limited to teratogenicity, orthostatic changes, increased risk of SI, risk of mood swings, risk of electrolyte disturbance and increased risk of bleeding.  The client appeared to understand the information shared based on their questions and responses made in session.      Impression: Charleen has anxiety and depression that should respond to medication and therapy Prognosis good    Plan:  Cont  Cymbalta 60mg hs  Cont Trazodone 50mg 1 tablet at bedtime  RTC 3m and email/call if problems    Session: [id number]       the client was seen face to face to assess their response to the medication prescribed, evaluate compliance, continue counseling and education and to coordinate care if needed. At least 50% of the time was spent counseling the client and coordinating care.

## 2020-05-11 ENCOUNTER — LAB VISIT (OUTPATIENT)
Dept: LAB | Facility: HOSPITAL | Age: 55
End: 2020-05-11
Attending: COLON & RECTAL SURGERY
Payer: MEDICARE

## 2020-05-11 DIAGNOSIS — E46 UNSPECIFIED PROTEIN-CALORIE MALNUTRITION: Primary | ICD-10-CM

## 2020-05-11 LAB
ALBUMIN SERPL BCP-MCNC: 4.2 G/DL (ref 3.5–5.2)
ALP SERPL-CCNC: 67 U/L (ref 55–135)
ALT SERPL W/O P-5'-P-CCNC: 13 U/L (ref 10–44)
ANION GAP SERPL CALC-SCNC: 9 MMOL/L (ref 8–16)
AST SERPL-CCNC: 20 U/L (ref 10–40)
BASOPHILS # BLD AUTO: 0.09 K/UL (ref 0–0.2)
BASOPHILS NFR BLD: 1.3 % (ref 0–1.9)
BILIRUB SERPL-MCNC: 0.6 MG/DL (ref 0.1–1)
BUN SERPL-MCNC: 16 MG/DL (ref 6–20)
CALCIUM SERPL-MCNC: 9.7 MG/DL (ref 8.7–10.5)
CHLORIDE SERPL-SCNC: 98 MMOL/L (ref 95–110)
CO2 SERPL-SCNC: 28 MMOL/L (ref 23–29)
CREAT SERPL-MCNC: 0.8 MG/DL (ref 0.5–1.4)
DIFFERENTIAL METHOD: NORMAL
EOSINOPHIL # BLD AUTO: 0.4 K/UL (ref 0–0.5)
EOSINOPHIL NFR BLD: 5.1 % (ref 0–8)
ERYTHROCYTE [DISTWIDTH] IN BLOOD BY AUTOMATED COUNT: 13.3 % (ref 11.5–14.5)
EST. GFR  (AFRICAN AMERICAN): >60 ML/MIN/1.73 M^2
EST. GFR  (NON AFRICAN AMERICAN): >60 ML/MIN/1.73 M^2
GLUCOSE SERPL-MCNC: 87 MG/DL (ref 70–110)
HCT VFR BLD AUTO: 37.9 % (ref 37–48.5)
HGB BLD-MCNC: 12.5 G/DL (ref 12–16)
IMM GRANULOCYTES # BLD AUTO: 0.02 K/UL (ref 0–0.04)
IMM GRANULOCYTES NFR BLD AUTO: 0.3 % (ref 0–0.5)
LYMPHOCYTES # BLD AUTO: 2.3 K/UL (ref 1–4.8)
LYMPHOCYTES NFR BLD: 32.3 % (ref 18–48)
MCH RBC QN AUTO: 29.8 PG (ref 27–31)
MCHC RBC AUTO-ENTMCNC: 33 G/DL (ref 32–36)
MCV RBC AUTO: 90 FL (ref 82–98)
MONOCYTES # BLD AUTO: 0.5 K/UL (ref 0.3–1)
MONOCYTES NFR BLD: 6.6 % (ref 4–15)
NEUTROPHILS # BLD AUTO: 3.9 K/UL (ref 1.8–7.7)
NEUTROPHILS NFR BLD: 54.4 % (ref 38–73)
NRBC BLD-RTO: 0 /100 WBC
PLATELET # BLD AUTO: 243 K/UL (ref 150–350)
PMV BLD AUTO: 10.4 FL (ref 9.2–12.9)
POTASSIUM SERPL-SCNC: 3.9 MMOL/L (ref 3.5–5.1)
PREALB SERPL-MCNC: 27 MG/DL (ref 20–43)
PROT SERPL-MCNC: 7.9 G/DL (ref 6–8.4)
RBC # BLD AUTO: 4.2 M/UL (ref 4–5.4)
SODIUM SERPL-SCNC: 135 MMOL/L (ref 136–145)
WBC # BLD AUTO: 7.1 K/UL (ref 3.9–12.7)

## 2020-05-11 PROCEDURE — 36415 COLL VENOUS BLD VENIPUNCTURE: CPT

## 2020-05-11 PROCEDURE — 84134 ASSAY OF PREALBUMIN: CPT

## 2020-05-11 PROCEDURE — 85025 COMPLETE CBC W/AUTO DIFF WBC: CPT

## 2020-05-11 PROCEDURE — 80053 COMPREHEN METABOLIC PANEL: CPT

## 2020-05-18 ENCOUNTER — HOSPITAL ENCOUNTER (OUTPATIENT)
Dept: RADIOLOGY | Facility: CLINIC | Age: 55
Discharge: HOME | End: 2020-05-18
Attending: FAMILY MEDICINE
Payer: MEDICARE

## 2020-05-18 ENCOUNTER — OFFICE VISIT (OUTPATIENT)
Dept: FAMILY MEDICINE | Facility: CLINIC | Age: 55
End: 2020-05-18
Payer: MEDICARE

## 2020-05-18 VITALS
HEART RATE: 83 BPM | DIASTOLIC BLOOD PRESSURE: 80 MMHG | OXYGEN SATURATION: 98 % | TEMPERATURE: 98 F | WEIGHT: 113.56 LBS | BODY MASS INDEX: 20.11 KG/M2 | SYSTOLIC BLOOD PRESSURE: 128 MMHG

## 2020-05-18 VITALS — HEIGHT: 63 IN | WEIGHT: 113.56 LBS | BODY MASS INDEX: 20.12 KG/M2

## 2020-05-18 DIAGNOSIS — Z12.31 BREAST CANCER SCREENING BY MAMMOGRAM: Primary | ICD-10-CM

## 2020-05-18 DIAGNOSIS — Z12.31 BREAST CANCER SCREENING BY MAMMOGRAM: ICD-10-CM

## 2020-05-18 DIAGNOSIS — R21 RASH OF BACK: ICD-10-CM

## 2020-05-18 PROCEDURE — 99999 PR PBB SHADOW E&M-EST. PATIENT-LVL IV: ICD-10-PCS | Mod: PBBFAC,,, | Performed by: FAMILY MEDICINE

## 2020-05-18 PROCEDURE — 77067 MAMMO DIGITAL SCREENING BILAT WITH TOMOSYNTHESIS_CAD: ICD-10-PCS | Mod: 26,,, | Performed by: RADIOLOGY

## 2020-05-18 PROCEDURE — 77063 MAMMO DIGITAL SCREENING BILAT WITH TOMOSYNTHESIS_CAD: ICD-10-PCS | Mod: 26,,, | Performed by: RADIOLOGY

## 2020-05-18 PROCEDURE — 99204 PR OFFICE/OUTPT VISIT, NEW, LEVL IV, 45-59 MIN: ICD-10-PCS | Mod: S$GLB,,, | Performed by: FAMILY MEDICINE

## 2020-05-18 PROCEDURE — 77067 SCR MAMMO BI INCL CAD: CPT | Mod: TC,PO

## 2020-05-18 PROCEDURE — 77063 BREAST TOMOSYNTHESIS BI: CPT | Mod: 26,,, | Performed by: RADIOLOGY

## 2020-05-18 PROCEDURE — 99204 OFFICE O/P NEW MOD 45 MIN: CPT | Mod: S$GLB,,, | Performed by: FAMILY MEDICINE

## 2020-05-18 PROCEDURE — 77067 SCR MAMMO BI INCL CAD: CPT | Mod: 26,,, | Performed by: RADIOLOGY

## 2020-05-18 PROCEDURE — 3008F PR BODY MASS INDEX (BMI) DOCUMENTED: ICD-10-PCS | Mod: CPTII,S$GLB,, | Performed by: FAMILY MEDICINE

## 2020-05-18 PROCEDURE — 3008F BODY MASS INDEX DOCD: CPT | Mod: CPTII,S$GLB,, | Performed by: FAMILY MEDICINE

## 2020-05-18 PROCEDURE — 99999 PR PBB SHADOW E&M-EST. PATIENT-LVL IV: CPT | Mod: PBBFAC,,, | Performed by: FAMILY MEDICINE

## 2020-05-18 RX ORDER — DICYCLOMINE HYDROCHLORIDE 10 MG/1
CAPSULE ORAL
COMMUNITY
Start: 2020-05-07 | End: 2020-06-15 | Stop reason: SDUPTHER

## 2020-05-18 RX ORDER — TRIAMCINOLONE ACETONIDE 0.25 MG/G
CREAM TOPICAL 2 TIMES DAILY
Qty: 80 G | Refills: 1 | Status: SHIPPED | OUTPATIENT
Start: 2020-05-18 | End: 2020-06-19

## 2020-05-18 NOTE — PROGRESS NOTES
Subjective:       Patient ID: Charleen Louis is a 54 y.o. female.    Chief Complaint: Establish Care    HPI'    Presents to clinic to establish care.   Currently seeing Dr. Rae Rosado for chronic abdominal pain. Has a history of cervical cancer with mets to abdomen and lung. Will be getting more scans done on her abdomen soon.     Also has been having a rash on and off in the middle of her back for some time. Doesn't really notice it but can itch sometime.     Past Medical History:   Diagnosis Date    Blood transfusion     Cancer 2009    Cervical    Wears glasses        Past Surgical History:   Procedure Laterality Date    CHOLECYSTECTOMY      HYSTERECTOMY      BSO    Port-a-Cath placement      some of intestines removed         No family history on file.    Social History     Tobacco Use    Smoking status: Current Every Day Smoker     Packs/day: 0.50     Years: 25.00     Pack years: 12.50     Types: Cigarettes     Start date: 1/1/1982    Smokeless tobacco: Current User   Substance Use Topics    Alcohol use: No     Comment: occ daiquiri    Drug use: No       Social History     Substance and Sexual Activity   Sexual Activity Not Currently          Current Outpatient Medications:     dicyclomine (BENTYL) 10 MG capsule, , Disp: , Rfl:     DULoxetine (CYMBALTA) 60 MG capsule, Take 1 tablet at bedtime each night, Disp: 30 capsule, Rfl: 3    HYDROmorphone (DILAUDID) 4 MG tablet, Take 1 tablet (4 mg total) by mouth every 4 (four) hours as needed for Pain., Disp: 180 tablet, Rfl: 0    morphine (MS CONTIN) 30 MG 12 hr tablet, Take 2 tablets (60 mg total) by mouth 2 (two) times daily., Disp: 120 tablet, Rfl: 0    pantoprazole (PROTONIX) 40 MG tablet, Take 40 mg by mouth once daily., Disp: , Rfl: 3    traZODone (DESYREL) 50 MG tablet, Take 1/2 to 1 tablet at bestime for for sleep, Disp: 30 tablet, Rfl: 3     Review of patient's allergies indicates:  No Known Allergies     Single    Review of Systems    Constitutional: Negative for chills and fever.   HENT: Negative for congestion and sore throat.    Eyes: Negative for visual disturbance.   Respiratory: Negative for cough and shortness of breath.    Cardiovascular: Negative for chest pain.   Gastrointestinal: Positive for abdominal pain.   Genitourinary: Negative for dysuria.   Musculoskeletal: Negative for joint swelling.   Skin: Positive for rash. Negative for wound.   Neurological: Negative for dizziness and headaches.   Hematological: Does not bruise/bleed easily.           Objective:          Vitals:    05/18/20 1423   BP: 128/80   Pulse: 83   Temp: 98.2 °F (36.8 °C)   SpO2: 98%   Weight: 51.5 kg (113 lb 8.6 oz)       Physical Exam   Constitutional: She appears well-developed and well-nourished.   HENT:   Head: Normocephalic and atraumatic.   Eyes: Conjunctivae are normal.   Cardiovascular: Normal rate, regular rhythm and normal heart sounds.   Pulmonary/Chest: Effort normal and breath sounds normal.   Abdominal: Soft. Bowel sounds are normal. She exhibits no distension.   Musculoskeletal: Normal range of motion.   Neurological: She is alert.   Skin: Skin is warm.        Psychiatric: She has a normal mood and affect. Her behavior is normal.   Vitals reviewed.              Assessment/Plan     Charleen was seen today for establish care.    Diagnoses and all orders for this visit:    Breast cancer screening by mammogram  -     Mammo Digital Screening Bilateral With CAD; Future    Rash of back  -     triamcinolone acetonide 0.025% (KENALOG) 0.025 % cream; Apply topically 2 (two) times daily.      Follow up in about 4 weeks (around 6/15/2020) for f/u abd recs.    Future Appointments   Date Time Provider Department Center   6/5/2020  3:00 PM Jaylene Sanz MP [facility] OPS[facility]CH [facility] at [facility]   6/15/2020  9:40 AM Alisson Stevenson MD [facility] [place]   7/17/2020  9:30 AM LAB, [facility]   7/17/2020 10:45 AM Kal Yao,  [facility]  [facility]         Alisson Stevenson MD  [facility]

## 2020-05-20 ENCOUNTER — PATIENT MESSAGE (OUTPATIENT)
Dept: PSYCHIATRY | Facility: CLINIC | Age: 55
End: 2020-05-20

## 2020-05-29 DIAGNOSIS — Z12.11 COLON CANCER SCREENING: ICD-10-CM

## 2020-06-01 ENCOUNTER — PATIENT OUTREACH (OUTPATIENT)
Dept: ADMINISTRATIVE | Facility: HOSPITAL | Age: 55
End: 2020-06-01

## 2020-06-01 NOTE — LETTER
June 9, 2020    Charleen Louis  [address]  [place] [address]             [facility]  [address]  [facility] [address]  Phone: [phone] Charleen Louis   [address]   [place] [address]     Dear, Charleen Louis     [facility] is committed to your overall health.  To help you get the most out of each of your visits, we will review your information to make sure you are up to date on all of your recommended tests and/or procedures.  Alisson Stevenson MD  has found that your chart shows you may be due for the following:     TETANUS VACCINE   Pneumococcal Vaccine   Colonoscopy     If you have had any of the above done at another facility, please bring the records with you or Fax them to [phone] so that your record at [facility] will be complete. If you have not had any of these tests or procedures done recently and would like to complete this testing ,  please call [phone] or send a message through your [facility] portal to your provider's office.     If you have an upcoming scheduled appointment for the above test and/or procedures, please disregard this letter.     If you are currently taking medication, please bring it with you to your appointment for review.     Thank you for letting us care for you,     Eze Vaughan, Care Coordinator   [facility] [facility]   Phone: [phone]   Fax: [phone]

## 2020-06-01 NOTE — PROGRESS NOTES
Chart review completed 06/01/2020.  Care Everywhere updates requested and reviewed.  Immunizations reconciled. Media reviewed.      Portal Message Sent.

## 2020-06-05 ENCOUNTER — OFFICE VISIT (OUTPATIENT)
Dept: PSYCHIATRY | Facility: CLINIC | Age: 55
End: 2020-06-05
Payer: MEDICARE

## 2020-06-05 VITALS
SYSTOLIC BLOOD PRESSURE: 123 MMHG | BODY MASS INDEX: 20.25 KG/M2 | WEIGHT: 114.31 LBS | TEMPERATURE: 98 F | HEIGHT: 63 IN | DIASTOLIC BLOOD PRESSURE: 77 MMHG | HEART RATE: 85 BPM

## 2020-06-05 DIAGNOSIS — F32.A ANXIETY AND DEPRESSION: Primary | ICD-10-CM

## 2020-06-05 DIAGNOSIS — F41.9 ANXIETY AND DEPRESSION: Primary | ICD-10-CM

## 2020-06-05 PROCEDURE — 99999 PR PBB SHADOW E&M-EST. PATIENT-LVL III: CPT | Mod: PBBFAC,,, | Performed by: PSYCHOLOGIST

## 2020-06-05 PROCEDURE — 3008F PR BODY MASS INDEX (BMI) DOCUMENTED: ICD-10-PCS | Mod: CPTII,S$GLB,, | Performed by: PSYCHOLOGIST

## 2020-06-05 PROCEDURE — 3008F BODY MASS INDEX DOCD: CPT | Mod: CPTII,S$GLB,, | Performed by: PSYCHOLOGIST

## 2020-06-05 PROCEDURE — 99213 OFFICE O/P EST LOW 20 MIN: CPT | Mod: S$GLB,,, | Performed by: PSYCHOLOGIST

## 2020-06-05 PROCEDURE — 99213 PR OFFICE/OUTPT VISIT, EST, LEVL III, 20-29 MIN: ICD-10-PCS | Mod: S$GLB,,, | Performed by: PSYCHOLOGIST

## 2020-06-05 PROCEDURE — 99999 PR PBB SHADOW E&M-EST. PATIENT-LVL III: ICD-10-PCS | Mod: PBBFAC,,, | Performed by: PSYCHOLOGIST

## 2020-06-05 NOTE — PROGRESS NOTES
Client: Charleen Louis  : 1965  Suhail Tomlinson MD  [id number]    Presenting complaint:/Past psychiatric treatment: Charleen presented with a history of recurrent depression and anxiety that has escalated this year.  She was started on Cymbalta 60mg hs and has had a positive response to that medication. She reported she has not cried out of the blue and that she is feeling good emotionally again.  She is also reporting her stomach problems have resolved (started Bentyl) and she is able to eat regularly again.   Charleen is also in therapy.  She just found out her one son graduates this month.  She has an adult son with a substance use problem and one son also  in July.      Today she presented with an improved mood, good emo expressoin and she has no subjective mood or anxiety complaints.  Sleep and appetite were reported a good. She has no  cont pain but she is now sleeping well with Trazodone. She is handling things much better and her one son's has noticed her improvement.   On the PHQ9  her score today was 1 [6]  On the  GAD7  4 [4].  She denied SI/HI/delusions/hallucinations/mood swings and expansive mood periods. She reported no ease of frustration or anger issues. 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  She denied any other psychiatric inpatient care.  Stressors: health complaints, death of 29yo son in July, another son   Has Asperger and one adult son  in .      Family psychiatric history: none reported  Relevant lab reviewed     ALT<6 CBC normal   Relevant EKG reviewed  ST st abnormalities    Review of patient's allergies indicates:  No Known Allergies    Current Outpatient Medications:     dicyclomine (BENTYL) 10 MG capsule, , Disp: , Rfl:     DULoxetine (CYMBALTA) 60 MG capsule, Take 1 tablet at bedtime each night, Disp: 30 capsule, Rfl: 3    HYDROmorphone (DILAUDID) 4 MG tablet, Take 1 tablet (4 mg total) by mouth every 4  (four) hours as needed for Pain., Disp: 180 tablet, Rfl: 0    morphine (MS CONTIN) 30 MG 12 hr tablet, Take 2 tablets (60 mg total) by mouth 2 (two) times daily., Disp: 120 tablet, Rfl: 0    pantoprazole (PROTONIX) 40 MG tablet, Take 40 mg by mouth once daily., Disp: , Rfl: 3    traZODone (DESYREL) 50 MG tablet, Take 1/2 to 1 tablet at bestime for for sleep, Disp: 30 tablet, Rfl: 3    triamcinolone acetonide 0.025% (KENALOG) 0.025 % cream, Apply topically 2 (two) times daily., Disp: 80 g, Rfl: 1  Past Medical History:   Diagnosis Date    Blood transfusion     Cervical cancer 2009    mets to abdomen and lungs    Wears glasses      Clinical presentation:  Appearance:  The client presented in casual attire evidencing good grooming and hygiene    Neuro:  the client was alert, oriented x 4 and evidenced good judgement and insight.      Attention/concentration:  Was good in session    Memory:  The client had no subjective memory complaints and she was able to recall information discussed in session accurately    Judgement:  behavior is adequate to the situation    Fund of knowledge:  intact and appropriate to age and level of education    Gait/station:  was normal    Heart/lungs: no cardiac symptoms/normal effort     Skin/Extremities:  warm and dry, no rashes/lesions/bruises or edema noted..     Mood:  was euthymic.  No SI/HI/delusions/hallucinations/mood swings or expansive mood periods reported or suspected.     Affect: was normal range    Speech:  normal rate and tone     Sleep: the client has been sleeping well with Trazodone. Wakes up to use the restroom.     Appetite: was reported as good, does skip meals but drinks Boost. She has lost weight since the last visit.     Pain complaints: Intermittent abdominal pain, not in any distress.Occ joint and muscle pain    ETOH/Substance use history:  The client had no reported ETOH/or other substance use problems     Psychosocial history:  The client currently lives  with her 18yo son with Asperger.  She gave one child up at birth, one son , one lives with his father (addiction history).  They reported having positive peer support and state they engage in leisure activities regularly.    Education/session discussion:  · Reviewed general issues about treatment of depression/anxiety/sleep including utility of medication and therapy. Discussed the risks/benefits/alternatives of medication with client.  Reviewed typical side effects and potential adverse reactions of an antidepressant medication including but not limited to teratogenicity, orthostatic changes, increased risk of SI, risk of mood swings, risk of electrolyte disturbance and increased risk of bleeding.  The client appeared to understand the information shared based on their questions and responses made in session.      Impression: Charleen has anxiety and depression that has resolved with her current medications.  Prognosis good    Plan:  Cont  Cymbalta 60mg hs  Cont Trazodone 50mg 1 tablet at bedtime  F/U PCP or new provider    Session: [id number]   the client was seen face to face to assess their response to the medication prescribed, evaluate compliance, continue counseling and education and to coordinate care if needed. At least 50% of the time was spent counseling the client and coordinating care.

## 2020-06-06 RX ORDER — TRAZODONE HYDROCHLORIDE 50 MG/1
TABLET ORAL
Qty: 30 TABLET | Refills: 3 | Status: SHIPPED | OUTPATIENT
Start: 2020-06-06 | End: 2021-07-08

## 2020-06-06 RX ORDER — DULOXETIN HYDROCHLORIDE 60 MG/1
CAPSULE, DELAYED RELEASE ORAL
Qty: 30 CAPSULE | Refills: 3 | Status: SHIPPED | OUTPATIENT
Start: 2020-06-06 | End: 2021-07-08

## 2020-06-15 ENCOUNTER — HOSPITAL ENCOUNTER (OUTPATIENT)
Dept: RADIOLOGY | Facility: CLINIC | Age: 55
Discharge: HOME | End: 2020-06-15
Attending: FAMILY MEDICINE
Payer: MEDICARE

## 2020-06-15 ENCOUNTER — OFFICE VISIT (OUTPATIENT)
Dept: FAMILY MEDICINE | Facility: CLINIC | Age: 55
End: 2020-06-15
Payer: MEDICARE

## 2020-06-15 VITALS
HEIGHT: 63 IN | DIASTOLIC BLOOD PRESSURE: 76 MMHG | TEMPERATURE: 97 F | WEIGHT: 116.19 LBS | OXYGEN SATURATION: 96 % | HEART RATE: 68 BPM | BODY MASS INDEX: 20.59 KG/M2 | SYSTOLIC BLOOD PRESSURE: 130 MMHG

## 2020-06-15 DIAGNOSIS — G89.29 CHRONIC MIDLINE LOW BACK PAIN, UNSPECIFIED WHETHER SCIATICA PRESENT: Primary | ICD-10-CM

## 2020-06-15 DIAGNOSIS — Z87.898 HISTORY OF DIARRHEA: ICD-10-CM

## 2020-06-15 DIAGNOSIS — R10.84 GENERALIZED ABDOMINAL PAIN: ICD-10-CM

## 2020-06-15 DIAGNOSIS — C53.1 MALIGNANT NEOPLASM OF EXOCERVIX: ICD-10-CM

## 2020-06-15 DIAGNOSIS — R21 RASH: ICD-10-CM

## 2020-06-15 DIAGNOSIS — M54.50 CHRONIC MIDLINE LOW BACK PAIN, UNSPECIFIED WHETHER SCIATICA PRESENT: ICD-10-CM

## 2020-06-15 DIAGNOSIS — G89.29 CHRONIC MIDLINE LOW BACK PAIN, UNSPECIFIED WHETHER SCIATICA PRESENT: ICD-10-CM

## 2020-06-15 DIAGNOSIS — M54.50 CHRONIC MIDLINE LOW BACK PAIN, UNSPECIFIED WHETHER SCIATICA PRESENT: Primary | ICD-10-CM

## 2020-06-15 PROCEDURE — 99214 PR OFFICE/OUTPT VISIT, EST, LEVL IV, 30-39 MIN: ICD-10-PCS | Mod: S$GLB,,, | Performed by: FAMILY MEDICINE

## 2020-06-15 PROCEDURE — 99999 PR PBB SHADOW E&M-EST. PATIENT-LVL IV: ICD-10-PCS | Mod: PBBFAC,,, | Performed by: FAMILY MEDICINE

## 2020-06-15 PROCEDURE — 72110 XR LUMBAR SPINE COMPLETE 5 VIEW: ICD-10-PCS | Mod: 26,,, | Performed by: RADIOLOGY

## 2020-06-15 PROCEDURE — 3008F PR BODY MASS INDEX (BMI) DOCUMENTED: ICD-10-PCS | Mod: CPTII,S$GLB,, | Performed by: FAMILY MEDICINE

## 2020-06-15 PROCEDURE — 72110 X-RAY EXAM L-2 SPINE 4/>VWS: CPT | Mod: 26,,, | Performed by: RADIOLOGY

## 2020-06-15 PROCEDURE — 99214 OFFICE O/P EST MOD 30 MIN: CPT | Mod: S$GLB,,, | Performed by: FAMILY MEDICINE

## 2020-06-15 PROCEDURE — 3008F BODY MASS INDEX DOCD: CPT | Mod: CPTII,S$GLB,, | Performed by: FAMILY MEDICINE

## 2020-06-15 PROCEDURE — 99999 PR PBB SHADOW E&M-EST. PATIENT-LVL IV: CPT | Mod: PBBFAC,,, | Performed by: FAMILY MEDICINE

## 2020-06-15 PROCEDURE — 72110 X-RAY EXAM L-2 SPINE 4/>VWS: CPT | Mod: TC,FY,PO

## 2020-06-15 RX ORDER — HYDROMORPHONE HYDROCHLORIDE 4 MG/1
2 TABLET ORAL EVERY 6 HOURS PRN
Qty: 30 TABLET | Refills: 0 | Status: SHIPPED | OUTPATIENT
Start: 2020-06-15 | End: 2021-06-15

## 2020-06-15 RX ORDER — DICYCLOMINE HYDROCHLORIDE 10 MG/1
10 CAPSULE ORAL
Qty: 90 CAPSULE | Refills: 5 | Status: SHIPPED | OUTPATIENT
Start: 2020-06-15 | End: 2021-01-11

## 2020-06-15 NOTE — PROGRESS NOTES
Subjective:       Patient ID: Charleen Louis is a 54 y.o. female.    Chief Complaint: Follow-up    HPI    Ms. Louis presents to clinic for f/u of abdominal pain. Was started on bentyl by Dr. Rosado. Since being on this med her abdominal pain has improved. Has been able to eat more food.     Needs help with her back pain and diarrhea. Was placed on MS contin for her diarrhea. Been on it for over 10 years. Had already tried OTC meds and lomitil for diarrhea which failed to improve her symptoms. States she was given this for her diarrhea which had helped. Also taking dilaudid 4mg q4h for her chronic back pain. Is open to trying other methods to control her pain and diarrhea.     States that the rash on her back has also improved.     Past Medical History:   Diagnosis Date    Blood transfusion     Cervical cancer 2009    mets to abdomen and lungs    Wears glasses        Past Surgical History:   Procedure Laterality Date    CHOLECYSTECTOMY      HYSTERECTOMY      BSO    Port-a-Cath placement      some of intestines removed         No family history on file.    Social History     Tobacco Use    Smoking status: Current Every Day Smoker     Packs/day: 0.50     Years: 25.00     Pack years: 12.50     Types: Cigarettes     Start date: 1/1/1982    Smokeless tobacco: Current User   Substance Use Topics    Alcohol use: No     Comment: occ daiquiri    Drug use: No       Social History     Substance and Sexual Activity   Sexual Activity Not Currently          Current Outpatient Medications:     dicyclomine (BENTYL) 10 MG capsule, , Disp: , Rfl:     DULoxetine (CYMBALTA) 60 MG capsule, Take 1 tablet at bedtime each night, Disp: 30 capsule, Rfl: 3    HYDROmorphone (DILAUDID) 4 MG tablet, Take 1 tablet (4 mg total) by mouth every 4 (four) hours as needed for Pain., Disp: 180 tablet, Rfl: 0    morphine (MS CONTIN) 30 MG 12 hr tablet, Take 2 tablets (60 mg total) by mouth 2 (two) times daily., Disp: 120 tablet, Rfl: 0     "pantoprazole (PROTONIX) 40 MG tablet, Take 40 mg by mouth once daily., Disp: , Rfl: 3    traZODone (DESYREL) 50 MG tablet, Take 1/2 to 1 tablet at bestime for for sleep, Disp: 30 tablet, Rfl: 3    triamcinolone acetonide 0.025% (KENALOG) 0.025 % cream, Apply topically 2 (two) times daily., Disp: 80 g, Rfl: 1     Review of patient's allergies indicates:  No Known Allergies     Single    Review of Systems   Constitutional: Negative for chills and fever.   HENT: Negative for congestion and sore throat.    Eyes: Negative for visual disturbance.   Respiratory: Negative for cough and shortness of breath.    Cardiovascular: Negative for chest pain.   Gastrointestinal: Positive for abdominal pain. Negative for constipation, diarrhea, nausea and vomiting.   Genitourinary: Negative for dysuria.   Musculoskeletal: Positive for back pain. Negative for joint swelling.   Skin: Negative for rash and wound.   Neurological: Negative for dizziness and headaches.   Hematological: Does not bruise/bleed easily.           Objective:          Vitals:    06/15/20 0957   BP: 130/76   Pulse: 68   Temp: 96.8 °F (36 °C)   SpO2: 96%   Weight: 52.7 kg (116 lb 2.9 oz)   Height: 5' 3" (1.6 m)       Physical Exam  Vitals signs reviewed.   Constitutional:       Appearance: Normal appearance.   HENT:      Head: Normocephalic and atraumatic.   Eyes:      Conjunctiva/sclera: Conjunctivae normal.   Neck:      Musculoskeletal: Normal range of motion.   Cardiovascular:      Rate and Rhythm: Normal rate and regular rhythm.   Pulmonary:      Effort: Pulmonary effort is normal.   Abdominal:      General: Abdomen is flat. Bowel sounds are normal.   Skin:     General: Skin is warm.          Neurological:      General: No focal deficit present.      Mental Status: She is alert.   Psychiatric:         Mood and Affect: Mood normal.                 Assessment/Plan     Charleen was seen today for follow-up.    Diagnoses and all orders for this visit:    Chronic " midline low back pain, unspecified whether sciatica present  -     Ambulatory referral/consult to Pain Clinic; Future  -     Patient is on a high dose of narcotics. Will try to wean patient's dilaudid. Will cut back HYDROmorphone (DILAUDID) 4 MG tablet; Take 0.5 tablets (2 mg total) by mouth every 6 (six) hours as needed for Pain.  -     X-Ray Lumbar Spine Complete 5 View; Future    Malignant neoplasm of exocervix        - SHWETA and treatment completed.        - Managed by Dr. Yao    Generalized abdominal pain  -     Symptoms improved. Continue dicyclomine (BENTYL) 10 MG capsule; Take 1 capsule (10 mg total) by mouth 3 (three) times daily with meals.    Rash         - Improved. Continue to monitor.     History of diarrhea         - I don't think MS contin is appropriate treatment for her diarrhea. Reccommended she goes back to Dr. Rosado (GI) to find other treatments for her history of diarrhea.     Follow up in about 6 weeks (around 7/27/2020) for check up.    Future Appointments   Date Time Provider Department Center   7/17/2020  9:30 AM LAB, [facility]   7/17/2020 10:45 AM Kal Yao, DO [facility]   7/30/2020  2:40 PM Alisson Stevenson MD [facility] MED [place]         Alisson Stevenson MD  [facility]

## 2020-06-23 ENCOUNTER — PATIENT OUTREACH (OUTPATIENT)
Dept: ADMINISTRATIVE | Facility: OTHER | Age: 55
End: 2020-06-23

## 2020-06-24 NOTE — PROGRESS NOTES
Patient's chart was reviewed.   Requested updates within Care Everywhere  Health Maintenance was updated.

## 2020-06-25 ENCOUNTER — OFFICE VISIT (OUTPATIENT)
Dept: PAIN MEDICINE | Facility: CLINIC | Age: 55
End: 2020-06-25
Payer: MEDICARE

## 2020-06-25 VITALS
BODY MASS INDEX: 20.55 KG/M2 | DIASTOLIC BLOOD PRESSURE: 76 MMHG | SYSTOLIC BLOOD PRESSURE: 122 MMHG | WEIGHT: 116 LBS | HEIGHT: 63 IN | HEART RATE: 75 BPM

## 2020-06-25 DIAGNOSIS — M47.896 OTHER SPONDYLOSIS, LUMBAR REGION: Primary | ICD-10-CM

## 2020-06-25 DIAGNOSIS — M51.36 DDD (DEGENERATIVE DISC DISEASE), LUMBAR: ICD-10-CM

## 2020-06-25 DIAGNOSIS — M54.50 CHRONIC MIDLINE LOW BACK PAIN, UNSPECIFIED WHETHER SCIATICA PRESENT: ICD-10-CM

## 2020-06-25 DIAGNOSIS — Z01.818 PRE-OP TESTING: Primary | ICD-10-CM

## 2020-06-25 DIAGNOSIS — G89.29 CHRONIC MIDLINE LOW BACK PAIN, UNSPECIFIED WHETHER SCIATICA PRESENT: ICD-10-CM

## 2020-06-25 DIAGNOSIS — M47.896 OTHER SPONDYLOSIS, LUMBAR REGION: ICD-10-CM

## 2020-06-25 PROCEDURE — 3008F BODY MASS INDEX DOCD: CPT | Mod: CPTII,S$GLB,, | Performed by: ANESTHESIOLOGY

## 2020-06-25 PROCEDURE — 99999 PR PBB SHADOW E&M-EST. PATIENT-LVL III: ICD-10-PCS | Mod: PBBFAC,,, | Performed by: ANESTHESIOLOGY

## 2020-06-25 PROCEDURE — 99204 OFFICE O/P NEW MOD 45 MIN: CPT | Mod: S$GLB,,, | Performed by: ANESTHESIOLOGY

## 2020-06-25 PROCEDURE — 99999 PR PBB SHADOW E&M-EST. PATIENT-LVL III: CPT | Mod: PBBFAC,,, | Performed by: ANESTHESIOLOGY

## 2020-06-25 PROCEDURE — 99204 PR OFFICE/OUTPT VISIT, NEW, LEVL IV, 45-59 MIN: ICD-10-PCS | Mod: S$GLB,,, | Performed by: ANESTHESIOLOGY

## 2020-06-25 PROCEDURE — 3008F PR BODY MASS INDEX (BMI) DOCUMENTED: ICD-10-PCS | Mod: CPTII,S$GLB,, | Performed by: ANESTHESIOLOGY

## 2020-06-25 NOTE — PROGRESS NOTES
This note was completed with dictation software and grammatical errors may exist.    Referring Physician: Alisson Stevenson MD    PCP: Alisson Stevenson MD      CC: low back pain    HPI:   Charleen Louis is a 54 y.o. female referred his lower lower back pain.  Patient with history of cervical cancer.  She underwent surgical resection as well as chemoradiation treatment.  Most recent scans shows no active disease.  She presents to us lower back pain for over 10 years.  No recent traumatic incident.  She has constant aching, pain lower back.  Pain radiates down right posterior thigh times.  Lower back pain is more bothersome.  Pain worsens standing, getting up.  She has not had any physical therapy.  She has not had any interventions for lower.  She had been managed with chronic opioid medication 10 years.  She currently takes MS Contin and hydromorphone prescribed by her oncologist.  There are plans to wean her down opioid requirement in the near future.  She denies any worsening weakness.  No bowel bladder changes.    ROS:  CONSTITUTIONAL: No fevers, chills, night sweats, wt. loss, appetite changes  SKIN: no rashes or itching  ENT: No headaches, head trauma, vision changes, or eye pain  LYMPH NODES: None noticed   CV: No chest pain, palpitations.   RESP: No shortness of breath, dyspnea on exertion, cough, wheezing, or hemoptysis  GI: No nausea, emesis, diarrhea, constipation, melena, hematochezia, pain.    : No dysuria, hematuria, urgency, or frequency   HEME: No easy bruising, bleeding problems  PSYCHIATRIC: No depression, anxiety, psychosis, hallucinations.  NEURO: No seizures, memory loss, dizziness or difficulty sleeping  MSK: +HPI      Past Medical History:   Diagnosis Date    Blood transfusion     Cervical cancer 2009    mets to abdomen and lungs    Wears glasses      Past Surgical History:   Procedure Laterality Date    CHOLECYSTECTOMY      Port-a-Cath placement      some of intestines removed       "TOTAL ABDOMINAL HYSTERECTOMY       History reviewed. No pertinent family history.  Social History     Socioeconomic History    Marital status: Single     Spouse name: Not on file    Number of children: 3    Years of education: Not on file    Highest education level: Not on file   Occupational History    Occupation: Disabled    Occupation: Ex /office   Social Needs    Financial resource strain: Not on file    Food insecurity     Worry: Not on file     Inability: Not on file    Transportation needs     Medical: Not on file     Non-medical: Not on file   Tobacco Use    Smoking status: Current Every Day Smoker     Packs/day: 0.50     Years: 25.00     Pack years: 12.50     Types: Cigarettes     Start date: 1/1/1982    Smokeless tobacco: Current User   Substance and Sexual Activity    Alcohol use: No     Comment: occ daiquiri    Drug use: No    Sexual activity: Not Currently   Lifestyle    Physical activity     Days per week: Not on file     Minutes per session: Not on file    Stress: Very much   Relationships    Social connections     Talks on phone: Not on file     Gets together: Not on file     Attends Zoroastrianism service: Not on file     Active member of club or organization: Not on file     Attends meetings of clubs or organizations: Not on file     Relationship status: Not on file   Other Topics Concern    Not on file   Social History Narrative    18 Y Old with her at home    2 other grown and gone         Medications/Allergies: See med card    Vitals:    06/25/20 0907   BP: 122/76   Pulse: 75   Weight: 52.6 kg (116 lb)   Height: 5' 3" (1.6 m)   PainSc:   1   PainLoc: Back         Physical exam:    GENERAL: A and O x3, the patient appears well groomed and is in no acute distress.  Skin: No rashes or obvious lesions  HEENT: normocephalic, atraumatic  CARDIOVASCULAR:  Palpable peripheral pulses  LUNGS: easy work of breathing  ABDOMEN: soft, nontender   UPPER EXTREMITIES: Normal alignment, " normal range of motion, no atrophy, no skin changes,  hair growth and nail growth normal and equal bilaterally. No swelling, no tenderness.    LOWER EXTREMITIES:  Normal alignment, normal range of motion, no atrophy, no skin changes,  hair growth and nail growth normal and equal bilaterally. No swelling, no tenderness.  LUMBAR SPINE  Lumbar spine: ROM is very limited with flexion extension and oblique extension with moderate increased pain.    Marcus's test causes no increased pain on either side.    Supine straight leg raise is negative bilaterally.    Internal and external rotation of the hip causes no increased pain on either side.  Myofascial exam: No tenderness to palpation across lumbar paraspinous muscles.      MENTAL STATUS: normal orientation, speech, language, and fund of knowledge for social situation.  Emotional state appropriate.    CRANIAL NERVES:  II:  PERRL bilaterally,   III,IV,VI: EOMI.    V:  Facial sensation equal bilaterally  VII:  Facial motor function normal.  VIII:  Hearing equal to finger rub bilaterally  IX/X: Gag normal, palate symmetric  XI:  Shoulder shrug equal, head turn equal  XII:  Tongue midline without fasciculations      MOTOR: Tone and bulk: normal bilateral upper and lower Strength: normal   Delt Bi Tri WE WF     R 5 5 5 5 5 5   L 5 5 5 5 5 5     IP ADD ABD Quad TA Gas HAM  R 5 5 5 5 5 5 5  L 5 5 5 5 5 5 5    SENSATION: Light touch and pinprick intact bilaterally  REFLEXES: normal, symmetric, nonbrisk.  Toes down, no clonus. No hoffmans.  GAIT: normal rise, base, steps, and arm swing.        Imaging:  X-ray lumbar spine 6/15/20  The scoliotic angle measures 16° from the superior endplate of L1 to the inferior endplate of L3.  There is mild anterior subluxation of L3 over a distance of 5 mm.  Vertebral body heights are well maintained.  There is advanced degenerative disc disease at the L4-5 and L5-S1 levels with disc narrowing and vertebral endplate sclerosis.  There is  degenerative facet arthrosis at the L4-5 and L5-S1 levels with joint narrowing and periarticular sclerosis      Assessment:  Patient referred for lower back pain  1. Other spondylosis, lumbar region    2. Chronic midline low back pain, unspecified whether sciatica present    3. DDD (degenerative disc disease), lumbar          Plan:  1. I have stressed the importance of physical activity and exercise to improve overall health  2. Reviewed pertinent imaging and records with patient  3. I believe her low back pain maybe due to facet arthropathy and have recommended lumbar medial branch blocks as a diagnostic procedure.  If successful, would proceed with radiofrequency ablation.  4.  Patient currently on chronic high-dose opioid medication managed by her oncologist.  Patient understands that we do not continue or recommend chronic high-dose opioid medication.  She will continue to manage with her oncologist. She does express desire to wean down and I encourage to continue to do so.   5.  She will follow-up after the procedure      Thank you for referring this interesting patient, and I look forward to continuing to collaborate in her care.

## 2020-07-07 ENCOUNTER — TELEPHONE (OUTPATIENT)
Dept: PAIN MEDICINE | Facility: CLINIC | Age: 55
End: 2020-07-07

## 2020-07-07 NOTE — TELEPHONE ENCOUNTER
Tried calling pt to advise we will have to cancel her procedure bc she did not have the covid test done. No answer

## 2020-07-07 NOTE — TELEPHONE ENCOUNTER
----- Message from Christin Hernandez RN sent at 7/7/2020  3:55 PM CDT -----   I have not been able to reach this pt for her pre op phone call to give instructions and arrival time. There is no way to leave a message as her mailbox is full. She has not been to the drive thru test facility for COVID as of 2 pm today. Her procedure is scheduled for Thurs, 7/9. I f she does not make it for her covid test today, she will have to reschedule

## 2020-07-16 ENCOUNTER — PATIENT OUTREACH (OUTPATIENT)
Dept: ADMINISTRATIVE | Facility: HOSPITAL | Age: 55
End: 2020-07-16

## 2020-07-16 NOTE — PROGRESS NOTES
Chart review completed 2020.  Care Everywhere updates requested and reviewed.  Immunizations reconciled. Media reports reviewed.  Duplicate HM overrides and  orders removed.  Overdue HM topic chart audit and/or requested.  Overdue lab testing linked to upcoming lab appointments if applies.    Recently outreached. Will outreach fitkit offer.     Health Maintenance Due   Topic Date Due    HIV Screening  1981    TETANUS VACCINE  1984    Pneumococcal Vaccine (Highest Risk) (1 of 3 - PCV13) 1985    Shingles Vaccine (1 of 2) 2016    Colorectal Cancer Screening  2016

## 2020-07-24 DIAGNOSIS — Z12.11 COLON CANCER SCREENING: ICD-10-CM

## 2020-07-31 DIAGNOSIS — Z12.11 COLON CANCER SCREENING: ICD-10-CM

## 2020-09-18 DIAGNOSIS — Z12.11 COLON CANCER SCREENING: ICD-10-CM

## 2020-10-05 ENCOUNTER — PATIENT MESSAGE (OUTPATIENT)
Dept: ADMINISTRATIVE | Facility: HOSPITAL | Age: 55
End: 2020-10-05

## 2020-10-05 ENCOUNTER — LAB VISIT (OUTPATIENT)
Dept: LAB | Facility: HOSPITAL | Age: 55
End: 2020-10-05
Attending: INTERNAL MEDICINE
Payer: MEDICARE

## 2020-10-05 DIAGNOSIS — Z85.41 HISTORY OF CERVICAL CANCER: ICD-10-CM

## 2020-10-05 LAB
ALBUMIN SERPL BCP-MCNC: 4.5 G/DL (ref 3.5–5.2)
ALP SERPL-CCNC: 61 U/L (ref 38–145)
ALT SERPL W/O P-5'-P-CCNC: 23 U/L (ref 0–35)
ANION GAP SERPL CALC-SCNC: 7 MMOL/L (ref 8–16)
AST SERPL-CCNC: 37 U/L (ref 14–36)
BASOPHILS # BLD AUTO: 0.05 K/UL (ref 0–0.2)
BASOPHILS NFR BLD: 0.6 % (ref 0–1.9)
BILIRUB SERPL-MCNC: 0.4 MG/DL (ref 0.2–1.3)
BUN SERPL-MCNC: 13 MG/DL (ref 7–18)
CALCIUM SERPL-MCNC: 10 MG/DL (ref 8.4–10.2)
CHLORIDE SERPL-SCNC: 102 MMOL/L (ref 95–110)
CO2 SERPL-SCNC: 28 MMOL/L (ref 22–31)
CREAT SERPL-MCNC: 0.73 MG/DL (ref 0.5–1.4)
DIFFERENTIAL METHOD: NORMAL
EOSINOPHIL # BLD AUTO: 0.3 K/UL (ref 0–0.5)
EOSINOPHIL NFR BLD: 3.2 % (ref 0–8)
ERYTHROCYTE [DISTWIDTH] IN BLOOD BY AUTOMATED COUNT: 13.8 % (ref 11.5–14.5)
EST. GFR  (AFRICAN AMERICAN): >60 ML/MIN/1.73 M^2
EST. GFR  (NON AFRICAN AMERICAN): >60 ML/MIN/1.73 M^2
GLUCOSE SERPL-MCNC: 122 MG/DL (ref 70–110)
HCT VFR BLD AUTO: 39.7 % (ref 37–48.5)
HGB BLD-MCNC: 13.1 G/DL (ref 12–16)
IMM GRANULOCYTES # BLD AUTO: 0.02 K/UL (ref 0–0.04)
IMM GRANULOCYTES NFR BLD AUTO: 0.3 % (ref 0–0.5)
LYMPHOCYTES # BLD AUTO: 1.9 K/UL (ref 1–4.8)
LYMPHOCYTES NFR BLD: 24.8 % (ref 18–48)
MCH RBC QN AUTO: 30.8 PG (ref 27–31)
MCHC RBC AUTO-ENTMCNC: 33 G/DL (ref 32–36)
MCV RBC AUTO: 93 FL (ref 82–98)
MONOCYTES # BLD AUTO: 0.6 K/UL (ref 0.3–1)
MONOCYTES NFR BLD: 7.8 % (ref 4–15)
NEUTROPHILS # BLD AUTO: 4.9 K/UL (ref 1.8–7.7)
NEUTROPHILS NFR BLD: 63.3 % (ref 38–73)
NRBC BLD-RTO: 0 /100 WBC
PLATELET # BLD AUTO: 235 K/UL (ref 150–350)
PMV BLD AUTO: 9.5 FL (ref 9.2–12.9)
POTASSIUM SERPL-SCNC: 4.1 MMOL/L (ref 3.5–5.1)
PROT SERPL-MCNC: 7.9 G/DL (ref 6–8.4)
RBC # BLD AUTO: 4.26 M/UL (ref 4–5.4)
SODIUM SERPL-SCNC: 137 MMOL/L (ref 136–145)
WBC # BLD AUTO: 7.71 K/UL (ref 3.9–12.7)

## 2020-10-05 PROCEDURE — 85025 COMPLETE CBC W/AUTO DIFF WBC: CPT

## 2020-10-05 PROCEDURE — 80053 COMPREHEN METABOLIC PANEL: CPT | Mod: PN

## 2020-10-05 PROCEDURE — 80053 COMPREHEN METABOLIC PANEL: CPT

## 2020-10-05 PROCEDURE — 85025 COMPLETE CBC W/AUTO DIFF WBC: CPT | Mod: PN

## 2020-10-05 PROCEDURE — 36415 COLL VENOUS BLD VENIPUNCTURE: CPT | Mod: PN

## 2020-10-09 DIAGNOSIS — Z12.11 COLON CANCER SCREENING: ICD-10-CM

## 2020-10-15 ENCOUNTER — HOSPITAL ENCOUNTER (OUTPATIENT)
Dept: RADIOLOGY | Facility: HOSPITAL | Age: 55
Discharge: HOME | End: 2020-10-15
Attending: INTERNAL MEDICINE
Payer: MEDICARE

## 2020-10-15 VITALS — BODY MASS INDEX: 20.38 KG/M2 | WEIGHT: 115 LBS | HEIGHT: 63 IN

## 2020-10-15 LAB — GLUCOSE SERPL-MCNC: 95 MG/DL (ref 70–110)

## 2020-10-15 PROCEDURE — 82962 GLUCOSE BLOOD TEST: CPT | Mod: PO

## 2020-10-15 PROCEDURE — 78815 PET IMAGE W/CT SKULL-THIGH: CPT | Mod: TC,PO,PS

## 2020-10-21 ENCOUNTER — LAB VISIT (OUTPATIENT)
Dept: LAB | Facility: HOSPITAL | Age: 55
End: 2020-10-21
Attending: INTERNAL MEDICINE
Payer: MEDICARE

## 2020-10-21 DIAGNOSIS — Z85.41 HISTORY OF CERVICAL CANCER: ICD-10-CM

## 2020-10-21 LAB
ALBUMIN SERPL BCP-MCNC: 4.6 G/DL (ref 3.5–5.2)
ALP SERPL-CCNC: 64 U/L (ref 38–145)
ALT SERPL W/O P-5'-P-CCNC: 25 U/L (ref 0–35)
ANION GAP SERPL CALC-SCNC: 6 MMOL/L (ref 8–16)
AST SERPL-CCNC: 33 U/L (ref 14–36)
BASOPHILS # BLD AUTO: 0.05 K/UL (ref 0–0.2)
BASOPHILS NFR BLD: 0.6 % (ref 0–1.9)
BILIRUB SERPL-MCNC: 0.3 MG/DL (ref 0.2–1.3)
BUN SERPL-MCNC: 16 MG/DL (ref 7–18)
CALCIUM SERPL-MCNC: 10.2 MG/DL (ref 8.4–10.2)
CHLORIDE SERPL-SCNC: 104 MMOL/L (ref 95–110)
CO2 SERPL-SCNC: 30 MMOL/L (ref 22–31)
CREAT SERPL-MCNC: 0.75 MG/DL (ref 0.5–1.4)
DIFFERENTIAL METHOD: ABNORMAL
EOSINOPHIL # BLD AUTO: 0.2 K/UL (ref 0–0.5)
EOSINOPHIL NFR BLD: 2.7 % (ref 0–8)
ERYTHROCYTE [DISTWIDTH] IN BLOOD BY AUTOMATED COUNT: 13.3 % (ref 11.5–14.5)
EST. GFR  (AFRICAN AMERICAN): >60 ML/MIN/1.73 M^2
EST. GFR  (NON AFRICAN AMERICAN): >60 ML/MIN/1.73 M^2
GLUCOSE SERPL-MCNC: 110 MG/DL (ref 70–110)
HCT VFR BLD AUTO: 40.1 % (ref 37–48.5)
HGB BLD-MCNC: 13.4 G/DL (ref 12–16)
IMM GRANULOCYTES # BLD AUTO: 0.03 K/UL (ref 0–0.04)
IMM GRANULOCYTES NFR BLD AUTO: 0.4 % (ref 0–0.5)
LYMPHOCYTES # BLD AUTO: 2 K/UL (ref 1–4.8)
LYMPHOCYTES NFR BLD: 23.6 % (ref 18–48)
MCH RBC QN AUTO: 31.3 PG (ref 27–31)
MCHC RBC AUTO-ENTMCNC: 33.4 G/DL (ref 32–36)
MCV RBC AUTO: 94 FL (ref 82–98)
MONOCYTES # BLD AUTO: 0.6 K/UL (ref 0.3–1)
MONOCYTES NFR BLD: 6.7 % (ref 4–15)
NEUTROPHILS # BLD AUTO: 5.6 K/UL (ref 1.8–7.7)
NEUTROPHILS NFR BLD: 66 % (ref 38–73)
NRBC BLD-RTO: 0 /100 WBC
PLATELET # BLD AUTO: 257 K/UL (ref 150–350)
PMV BLD AUTO: 9.7 FL (ref 9.2–12.9)
POTASSIUM SERPL-SCNC: 4.2 MMOL/L (ref 3.5–5.1)
PROT SERPL-MCNC: 8 G/DL (ref 6–8.4)
RBC # BLD AUTO: 4.28 M/UL (ref 4–5.4)
SODIUM SERPL-SCNC: 140 MMOL/L (ref 136–145)
WBC # BLD AUTO: 8.55 K/UL (ref 3.9–12.7)

## 2020-10-21 PROCEDURE — 80053 COMPREHEN METABOLIC PANEL: CPT | Mod: PN

## 2020-10-21 PROCEDURE — 85025 COMPLETE CBC W/AUTO DIFF WBC: CPT | Mod: PN

## 2020-10-21 PROCEDURE — 80053 COMPREHEN METABOLIC PANEL: CPT

## 2020-10-21 PROCEDURE — 36415 COLL VENOUS BLD VENIPUNCTURE: CPT | Mod: PN

## 2020-10-21 PROCEDURE — 85025 COMPLETE CBC W/AUTO DIFF WBC: CPT

## 2020-10-29 NOTE — PROGRESS NOTES
"Individual Psychotherapy (PhD/LCSW)    2/12/2020    Site:  [facility] - PSYCHIATRY  [facility], [place]          Therapeutic Intervention: Met with patient.  Outpatient - Supportive psychotherapy 45 min - CPT Code 58187 and Outpatient - Interactive psychotherapy 45 min - CPT code 48770    Chief complaint/reason for encounter: depression and anxiety and grief    Interval history and content of current session: Reviewed chart. Met with Keyana and reviewed progress.  Had a wonderful socializing experience with neighbors and enjoyed dinner-until 15 mins afterwards-terrible swelling in gut on one side, severe pain, and terrible diarrhea.  Keyana tearfully explained that this happens very frequently-she can't make it up her stairs to the bathroom, it's happened in her car at her child's school, restaurants, etc.  She's humiliated and tearful-isolating and never wanting to be away from a bathroom.  This lead to a conversation regarding her medical records-she feels that her past (6+yrs ago) relapse is haunting her record.  She admits to the relapse but apparently whenever she sees a doctor she feels labeled as a "Crazy drug-seeking" pt.  She states her care has been compromised due to MDs making assumptions and not listening.  Referred to medical records for her information and also referred to Patient Relations/Advocate if she decides she wants to complain.  Encouraged Keyana to advocate for herself and to continue using her journal to track her medical condition and to record feelings.  Return as scheduled.      Treatment plan:  · Target symptoms: depression, anxiety , grief  · Why chosen therapy is appropriate versus another modality: relevant to diagnosis, patient responds to this modality, evidence based practice  · Outcome monitoring methods: self-report, observation  · Therapeutic intervention type: behavior modifying psychotherapy, supportive psychotherapy    Risk parameters:  Patient reports " EXAM DESCRIPTION:  FOOT RIGHT COMPLETE



IMAGES COMPLETED DATE/TIME:  10/29/2020 11:47 am



REASON FOR STUDY:  M84.374A STRESS FRACTURE, RIGHT FOOT, INITIAL ENCOUNTER FOR FRACTURE M84.374A  STR
ESS FRACTURE, RIGHT FOOT, INITIAL ENCOUNTER FOR



COMPARISON:  None.



NUMBER OF VIEWS:  Three views.



TECHNIQUE:  AP, lateral and oblique  radiographic images acquired of the right foot.



LIMITATIONS:  None.



FINDINGS:  MINERALIZATION: Osteopenia.

BONES: No acute fracture or dislocation.  No worrisome bone lesions.

JOINTS: No effusions.

SOFT TISSUES: No soft tissue swelling.  No foreign body.

OTHER: No other significant finding.



IMPRESSION:  Osteopenia.  No acute osseous finding.



TECHNICAL DOCUMENTATION:  JOB ID:  [id number]

 2011 [employer]- All Rights Reserved



Reading location - IP/workstation name: [email or web address] no suicidal ideation  Patient reports no homicidal ideation  Patient reports no self-injurious behavior  Patient reports no violent behavior    Verbal deficits: None    Patient's response to intervention:  The patient's response to intervention is accepting.    Progress toward goals and other mental status changes:  The patient's progress toward goals is fair .    Diagnosis:   1. Grief at loss of child        Plan:  individual psychotherapy Pt to go to ED or call 911 if symptoms worsen or if she has thoughts of harming self and/or others. Pt verbalized understanding.    Return to clinic: 2 weeks    Length of Service (minutes): 45 minutes

## 2020-10-30 ENCOUNTER — PATIENT MESSAGE (OUTPATIENT)
Dept: ADMINISTRATIVE | Facility: HOSPITAL | Age: 55
End: 2020-10-30

## 2021-02-25 DIAGNOSIS — Z12.11 COLON CANCER SCREENING: ICD-10-CM

## 2021-03-31 ENCOUNTER — PATIENT MESSAGE (OUTPATIENT)
Dept: FAMILY MEDICINE | Facility: CLINIC | Age: 56
End: 2021-03-31

## 2021-04-05 ENCOUNTER — PATIENT MESSAGE (OUTPATIENT)
Dept: ADMINISTRATIVE | Facility: HOSPITAL | Age: 56
End: 2021-04-05

## 2021-04-21 ENCOUNTER — LAB VISIT (OUTPATIENT)
Dept: LAB | Facility: HOSPITAL | Age: 56
End: 2021-04-21
Attending: PHYSICIAN ASSISTANT
Payer: MEDICARE

## 2021-04-21 DIAGNOSIS — C53.9 MALIGNANT NEOPLASM OF CERVIX, UNSPECIFIED SITE: ICD-10-CM

## 2021-04-21 DIAGNOSIS — Z85.41 HISTORY OF CERVICAL CANCER: ICD-10-CM

## 2021-04-21 LAB
ALBUMIN SERPL BCP-MCNC: 4.9 G/DL (ref 3.5–5.2)
ALP SERPL-CCNC: 84 U/L (ref 38–145)
ALT SERPL W/O P-5'-P-CCNC: 21 U/L (ref 0–35)
ANION GAP SERPL CALC-SCNC: 9 MMOL/L (ref 8–16)
AST SERPL-CCNC: 34 U/L (ref 14–36)
BASOPHILS # BLD AUTO: 0.05 K/UL (ref 0–0.2)
BASOPHILS NFR BLD: 0.8 % (ref 0–1.9)
BILIRUB SERPL-MCNC: 0.4 MG/DL (ref 0.2–1.3)
CALCIUM SERPL-MCNC: 10.9 MG/DL (ref 8.4–10.2)
CHLORIDE SERPL-SCNC: 102 MMOL/L (ref 95–110)
CO2 SERPL-SCNC: 28 MMOL/L (ref 22–31)
CREAT SERPL-MCNC: 0.63 MG/DL (ref 0.5–1.4)
DIFFERENTIAL METHOD: ABNORMAL
EOSINOPHIL # BLD AUTO: 0.2 K/UL (ref 0–0.5)
EOSINOPHIL NFR BLD: 3 % (ref 0–8)
ERYTHROCYTE [DISTWIDTH] IN BLOOD BY AUTOMATED COUNT: 13.2 % (ref 11.5–14.5)
EST. GFR  (AFRICAN AMERICAN): >60 ML/MIN/1.73 M^2
EST. GFR  (NON AFRICAN AMERICAN): >60 ML/MIN/1.73 M^2
GLUCOSE SERPL-MCNC: 104 MG/DL (ref 70–110)
HCT VFR BLD AUTO: 39.5 % (ref 37–48.5)
HGB BLD-MCNC: 13.6 G/DL (ref 12–16)
IMM GRANULOCYTES # BLD AUTO: 0.01 K/UL (ref 0–0.04)
IMM GRANULOCYTES NFR BLD AUTO: 0.2 % (ref 0–0.5)
LYMPHOCYTES # BLD AUTO: 2.5 K/UL (ref 1–4.8)
LYMPHOCYTES NFR BLD: 39.1 % (ref 18–48)
MCH RBC QN AUTO: 31.3 PG (ref 27–31)
MCHC RBC AUTO-ENTMCNC: 34.4 G/DL (ref 32–36)
MCV RBC AUTO: 91 FL (ref 82–98)
MONOCYTES # BLD AUTO: 0.5 K/UL (ref 0.3–1)
MONOCYTES NFR BLD: 7.9 % (ref 4–15)
NEUTROPHILS # BLD AUTO: 3.1 K/UL (ref 1.8–7.7)
NEUTROPHILS NFR BLD: 49 % (ref 38–73)
NRBC BLD-RTO: 0 /100 WBC
PLATELET # BLD AUTO: 254 K/UL (ref 150–450)
PMV BLD AUTO: 9.6 FL (ref 9.2–12.9)
POTASSIUM SERPL-SCNC: 3.7 MMOL/L (ref 3.5–5.1)
PROT SERPL-MCNC: 8.4 G/DL (ref 6–8.4)
RBC # BLD AUTO: 4.35 M/UL (ref 4–5.4)
SODIUM SERPL-SCNC: 139 MMOL/L (ref 136–145)
UUN UR-MCNC: 12 MG/DL (ref 7–18)
WBC # BLD AUTO: 6.31 K/UL (ref 3.9–12.7)

## 2021-04-21 PROCEDURE — 36415 COLL VENOUS BLD VENIPUNCTURE: CPT | Mod: PN | Performed by: PHYSICIAN ASSISTANT

## 2021-04-21 PROCEDURE — 85025 COMPLETE CBC W/AUTO DIFF WBC: CPT | Mod: PN | Performed by: PHYSICIAN ASSISTANT

## 2021-04-21 PROCEDURE — 85025 COMPLETE CBC W/AUTO DIFF WBC: CPT | Performed by: PHYSICIAN ASSISTANT

## 2021-04-21 PROCEDURE — 80053 COMPREHEN METABOLIC PANEL: CPT | Performed by: PHYSICIAN ASSISTANT

## 2021-04-21 PROCEDURE — 80053 COMPREHEN METABOLIC PANEL: CPT | Mod: PN | Performed by: PHYSICIAN ASSISTANT

## 2021-05-04 ENCOUNTER — PATIENT MESSAGE (OUTPATIENT)
Dept: RESEARCH | Facility: HOSPITAL | Age: 56
End: 2021-05-04

## 2021-05-25 ENCOUNTER — PATIENT OUTREACH (OUTPATIENT)
Dept: ADMINISTRATIVE | Facility: HOSPITAL | Age: 56
End: 2021-05-25

## 2021-05-26 DIAGNOSIS — Z12.11 COLON CANCER SCREENING: ICD-10-CM

## 2021-05-31 ENCOUNTER — PATIENT OUTREACH (OUTPATIENT)
Dept: ADMINISTRATIVE | Facility: HOSPITAL | Age: 56
End: 2021-05-31

## 2021-06-23 DIAGNOSIS — Z12.11 COLON CANCER SCREENING: ICD-10-CM

## 2021-07-06 ENCOUNTER — PATIENT MESSAGE (OUTPATIENT)
Dept: ADMINISTRATIVE | Facility: HOSPITAL | Age: 56
End: 2021-07-06

## 2021-07-07 ENCOUNTER — PATIENT MESSAGE (OUTPATIENT)
Dept: ADMINISTRATIVE | Facility: HOSPITAL | Age: 56
End: 2021-07-07

## 2021-07-08 PROBLEM — F32.A ANXIETY AND DEPRESSION: Status: ACTIVE | Noted: 2021-07-08

## 2021-07-08 PROBLEM — M54.42 CHRONIC BILATERAL LOW BACK PAIN WITH BILATERAL SCIATICA: Status: ACTIVE | Noted: 2019-07-16

## 2021-07-08 PROBLEM — K90.9 INTESTINAL MALABSORPTION: Status: ACTIVE | Noted: 2021-07-08

## 2021-07-08 PROBLEM — M54.41 CHRONIC BILATERAL LOW BACK PAIN WITH BILATERAL SCIATICA: Status: ACTIVE | Noted: 2019-07-16

## 2021-07-08 PROBLEM — F41.9 ANXIETY AND DEPRESSION: Status: ACTIVE | Noted: 2021-07-08

## 2021-10-05 ENCOUNTER — OFFICE VISIT (OUTPATIENT)
Dept: URGENT CARE | Facility: CLINIC | Age: 56
End: 2021-10-05
Payer: MEDICARE

## 2021-10-05 ENCOUNTER — PATIENT MESSAGE (OUTPATIENT)
Dept: FAMILY MEDICINE | Facility: CLINIC | Age: 56
End: 2021-10-05

## 2021-10-05 ENCOUNTER — TELEPHONE (OUTPATIENT)
Dept: FAMILY MEDICINE | Facility: CLINIC | Age: 56
End: 2021-10-05

## 2021-10-05 VITALS
WEIGHT: 116 LBS | HEART RATE: 73 BPM | RESPIRATION RATE: 16 BRPM | OXYGEN SATURATION: 98 % | TEMPERATURE: 98 F | HEIGHT: 63 IN | BODY MASS INDEX: 20.55 KG/M2 | SYSTOLIC BLOOD PRESSURE: 129 MMHG | DIASTOLIC BLOOD PRESSURE: 82 MMHG

## 2021-10-05 DIAGNOSIS — H53.9 VISION DISTURBANCE: ICD-10-CM

## 2021-10-05 DIAGNOSIS — S05.02XA ABRASION OF LEFT CORNEA, INITIAL ENCOUNTER: Primary | ICD-10-CM

## 2021-10-05 PROCEDURE — 3079F PR MOST RECENT DIASTOLIC BLOOD PRESSURE 80-89 MM HG: ICD-10-PCS | Mod: CPTII,S$GLB,, | Performed by: NURSE PRACTITIONER

## 2021-10-05 PROCEDURE — 3079F DIAST BP 80-89 MM HG: CPT | Mod: CPTII,S$GLB,, | Performed by: NURSE PRACTITIONER

## 2021-10-05 PROCEDURE — 1159F PR MEDICATION LIST DOCUMENTED IN MEDICAL RECORD: ICD-10-PCS | Mod: CPTII,S$GLB,, | Performed by: NURSE PRACTITIONER

## 2021-10-05 PROCEDURE — 99214 OFFICE O/P EST MOD 30 MIN: CPT | Mod: S$GLB,,, | Performed by: NURSE PRACTITIONER

## 2021-10-05 PROCEDURE — 99214 PR OFFICE/OUTPT VISIT, EST, LEVL IV, 30-39 MIN: ICD-10-PCS | Mod: S$GLB,,, | Performed by: NURSE PRACTITIONER

## 2021-10-05 PROCEDURE — 3074F SYST BP LT 130 MM HG: CPT | Mod: CPTII,S$GLB,, | Performed by: NURSE PRACTITIONER

## 2021-10-05 PROCEDURE — 1159F MED LIST DOCD IN RCRD: CPT | Mod: CPTII,S$GLB,, | Performed by: NURSE PRACTITIONER

## 2021-10-05 PROCEDURE — 3074F PR MOST RECENT SYSTOLIC BLOOD PRESSURE < 130 MM HG: ICD-10-PCS | Mod: CPTII,S$GLB,, | Performed by: NURSE PRACTITIONER

## 2021-10-05 PROCEDURE — 3008F BODY MASS INDEX DOCD: CPT | Mod: CPTII,S$GLB,, | Performed by: NURSE PRACTITIONER

## 2021-10-05 PROCEDURE — 3008F PR BODY MASS INDEX (BMI) DOCUMENTED: ICD-10-PCS | Mod: CPTII,S$GLB,, | Performed by: NURSE PRACTITIONER

## 2021-10-05 RX ORDER — OFLOXACIN 3 MG/ML
2 SOLUTION/ DROPS OPHTHALMIC 4 TIMES DAILY
Qty: 5 ML | Refills: 0 | Status: SHIPPED | OUTPATIENT
Start: 2021-10-05 | End: 2021-10-12

## 2021-10-05 RX ORDER — KETOROLAC TROMETHAMINE 5 MG/ML
1 SOLUTION OPHTHALMIC 4 TIMES DAILY PRN
Qty: 2.67 ML | Refills: 0 | Status: SHIPPED | OUTPATIENT
Start: 2021-10-05 | End: 2021-10-15

## 2021-10-06 ENCOUNTER — PATIENT MESSAGE (OUTPATIENT)
Dept: FAMILY MEDICINE | Facility: CLINIC | Age: 56
End: 2021-10-06

## 2021-10-07 ENCOUNTER — TELEPHONE (OUTPATIENT)
Dept: FAMILY MEDICINE | Facility: CLINIC | Age: 56
End: 2021-10-07

## 2021-11-16 ENCOUNTER — OFFICE VISIT (OUTPATIENT)
Dept: OPHTHALMOLOGY | Facility: CLINIC | Age: 56
End: 2021-11-16
Payer: MEDICARE

## 2021-11-16 DIAGNOSIS — H43.812 POSTERIOR VITREOUS DETACHMENT OF LEFT EYE: ICD-10-CM

## 2021-11-16 DIAGNOSIS — H35.352 CYSTOID MACULAR EDEMA, LEFT: ICD-10-CM

## 2021-11-16 DIAGNOSIS — H20.9 IRITIS OF LEFT EYE: Primary | ICD-10-CM

## 2021-11-16 PROCEDURE — 99999 PR PBB SHADOW E&M-EST. PATIENT-LVL II: CPT | Mod: PBBFAC,,, | Performed by: OPHTHALMOLOGY

## 2021-11-16 PROCEDURE — 1160F PR REVIEW ALL MEDS BY PRESCRIBER/CLIN PHARMACIST DOCUMENTED: ICD-10-PCS | Mod: CPTII,S$GLB,, | Performed by: OPHTHALMOLOGY

## 2021-11-16 PROCEDURE — 1159F MED LIST DOCD IN RCRD: CPT | Mod: CPTII,S$GLB,, | Performed by: OPHTHALMOLOGY

## 2021-11-16 PROCEDURE — 1160F RVW MEDS BY RX/DR IN RCRD: CPT | Mod: CPTII,S$GLB,, | Performed by: OPHTHALMOLOGY

## 2021-11-16 PROCEDURE — 99999 PR PBB SHADOW E&M-EST. PATIENT-LVL II: ICD-10-PCS | Mod: PBBFAC,,, | Performed by: OPHTHALMOLOGY

## 2021-11-16 PROCEDURE — 1159F PR MEDICATION LIST DOCUMENTED IN MEDICAL RECORD: ICD-10-PCS | Mod: CPTII,S$GLB,, | Performed by: OPHTHALMOLOGY

## 2021-11-16 PROCEDURE — 92134 POSTERIOR SEGMENT OCT RETINA (OCULAR COHERENCE TOMOGRAPHY)-BOTH EYES: ICD-10-PCS | Mod: S$GLB,,, | Performed by: OPHTHALMOLOGY

## 2021-11-16 PROCEDURE — 92134 CPTRZ OPH DX IMG PST SGM RTA: CPT | Mod: S$GLB,,, | Performed by: OPHTHALMOLOGY

## 2021-11-16 PROCEDURE — 99204 OFFICE O/P NEW MOD 45 MIN: CPT | Mod: S$GLB,,, | Performed by: OPHTHALMOLOGY

## 2021-11-16 PROCEDURE — 99204 PR OFFICE/OUTPT VISIT, NEW, LEVL IV, 45-59 MIN: ICD-10-PCS | Mod: S$GLB,,, | Performed by: OPHTHALMOLOGY

## 2021-11-16 RX ORDER — PREDNISOLONE ACETATE 10 MG/ML
1 SUSPENSION/ DROPS OPHTHALMIC 4 TIMES DAILY
Qty: 5 ML | Refills: 3 | Status: SHIPPED | OUTPATIENT
Start: 2021-11-16 | End: 2022-11-16

## 2024-10-29 PROBLEM — F11.20 UNCOMPLICATED OPIOID DEPENDENCE: Status: RESOLVED | Noted: 2019-07-16 | Resolved: 2024-10-29

## 2024-10-29 PROBLEM — R10.33 PERIUMBILICAL ABDOMINAL PAIN: Status: RESOLVED | Noted: 2019-07-16 | Resolved: 2024-10-29

## 2024-11-26 PROBLEM — R91.1 PULMONARY NODULE: Status: ACTIVE | Noted: 2024-11-26

## 2024-11-26 PROBLEM — J43.9 EMPHYSEMA LUNG: Status: ACTIVE | Noted: 2024-11-26

## 2024-11-26 PROBLEM — I25.10 ATHEROSCLEROSIS OF NATIVE CORONARY ARTERY OF NATIVE HEART WITHOUT ANGINA PECTORIS: Status: ACTIVE | Noted: 2024-11-26

## 2024-11-26 PROBLEM — Z87.898 HISTORY OF INTRAVENOUS DRUG USE: Status: ACTIVE | Noted: 2024-11-26

## 2024-11-26 PROBLEM — E83.52 HYPERCALCEMIA: Status: ACTIVE | Noted: 2024-11-26

## 2024-11-26 PROBLEM — I70.0 AORTIC ATHEROSCLEROSIS: Status: ACTIVE | Noted: 2024-11-26

## 2025-04-10 PROBLEM — F17.200 NICOTINE DEPENDENCE, UNSPECIFIED, UNCOMPLICATED: Status: ACTIVE | Noted: 2024-10-29

## 2025-05-11 ENCOUNTER — HOSPITAL ENCOUNTER (EMERGENCY)
Facility: HOSPITAL | Age: 60
Discharge: HOME | End: 2025-05-11
Attending: STUDENT IN AN ORGANIZED HEALTH CARE EDUCATION/TRAINING PROGRAM
Payer: MEDICARE

## 2025-05-11 VITALS
HEART RATE: 62 BPM | RESPIRATION RATE: 16 BRPM | SYSTOLIC BLOOD PRESSURE: 142 MMHG | TEMPERATURE: 98 F | HEIGHT: 63 IN | DIASTOLIC BLOOD PRESSURE: 72 MMHG | OXYGEN SATURATION: 100 % | BODY MASS INDEX: 23.92 KG/M2 | WEIGHT: 135 LBS

## 2025-05-11 DIAGNOSIS — S61.209A AVULSION OF FINGER, INITIAL ENCOUNTER: ICD-10-CM

## 2025-05-11 DIAGNOSIS — S61.213A LACERATION OF LEFT MIDDLE FINGER WITHOUT FOREIGN BODY WITHOUT DAMAGE TO NAIL, INITIAL ENCOUNTER: Primary | ICD-10-CM

## 2025-05-11 DIAGNOSIS — S61.215A LACERATION OF LEFT RING FINGER WITHOUT FOREIGN BODY WITHOUT DAMAGE TO NAIL, INITIAL ENCOUNTER: ICD-10-CM

## 2025-05-11 LAB
HCV AB SERPL QL IA: NORMAL
HIV 1+2 AB+HIV1 P24 AG SERPL QL IA: NORMAL

## 2025-05-11 PROCEDURE — 99284 EMERGENCY DEPT VISIT MOD MDM: CPT | Mod: 25

## 2025-05-11 PROCEDURE — 90471 IMMUNIZATION ADMIN: CPT

## 2025-05-11 PROCEDURE — 90715 TDAP VACCINE 7 YRS/> IM: CPT

## 2025-05-11 PROCEDURE — 25000003 PHARM REV CODE 250

## 2025-05-11 PROCEDURE — 87389 HIV-1 AG W/HIV-1&-2 AB AG IA: CPT | Performed by: EMERGENCY MEDICINE

## 2025-05-11 PROCEDURE — 12002 RPR S/N/AX/GEN/TRNK2.6-7.5CM: CPT

## 2025-05-11 PROCEDURE — 63600175 PHARM REV CODE 636 W HCPCS

## 2025-05-11 PROCEDURE — 86803 HEPATITIS C AB TEST: CPT | Performed by: EMERGENCY MEDICINE

## 2025-05-11 RX ORDER — CEPHALEXIN 500 MG/1
500 CAPSULE ORAL 4 TIMES DAILY
Qty: 28 CAPSULE | Refills: 0 | Status: SHIPPED | OUTPATIENT
Start: 2025-05-11 | End: 2025-05-18

## 2025-05-11 RX ORDER — BACITRACIN 500 [USP'U]/G
OINTMENT TOPICAL
Status: COMPLETED | OUTPATIENT
Start: 2025-05-11 | End: 2025-05-11

## 2025-05-11 RX ORDER — CEPHALEXIN 250 MG/1
500 CAPSULE ORAL
Status: COMPLETED | OUTPATIENT
Start: 2025-05-11 | End: 2025-05-11

## 2025-05-11 RX ORDER — LIDOCAINE HYDROCHLORIDE 10 MG/ML
10 INJECTION, SOLUTION EPIDURAL; INFILTRATION; INTRACAUDAL; PERINEURAL
Status: COMPLETED | OUTPATIENT
Start: 2025-05-11 | End: 2025-05-11

## 2025-05-11 RX ADMIN — BACITRACIN: 500 OINTMENT TOPICAL at 10:05

## 2025-05-11 RX ADMIN — CLOSTRIDIUM TETANI TOXOID ANTIGEN (FORMALDEHYDE INACTIVATED), CORYNEBACTERIUM DIPHTHERIAE TOXOID ANTIGEN (FORMALDEHYDE INACTIVATED), BORDETELLA PERTUSSIS TOXOID ANTIGEN (GLUTARALDEHYDE INACTIVATED), BORDETELLA PERTUSSIS FILAMENTOUS HEMAGGLUTININ ANTIGEN (FORMALDEHYDE INACTIVATED), BORDETELLA PERTUSSIS PERTACTIN ANTIGEN, AND BORDETELLA PERTUSSIS FIMBRIAE 2/3 ANTIGEN 0.5 ML: 5; 2; 2.5; 5; 3; 5 INJECTION, SUSPENSION INTRAMUSCULAR at 10:05

## 2025-05-11 RX ADMIN — LIDOCAINE HYDROCHLORIDE 100 MG: 10 INJECTION, SOLUTION EPIDURAL; INFILTRATION; INTRACAUDAL; PERINEURAL at 10:05

## 2025-05-11 RX ADMIN — CEPHALEXIN 500 MG: 250 CAPSULE ORAL at 12:05

## 2025-05-11 NOTE — DISCHARGE INSTRUCTIONS
Keep the wounds dry for 24 hours then they can get wet but do not submerge it under water.  Wear the finger splint for at least the 1st 7 days.  Please follow up with hand surgery for further evaluation and rechecked.  Take the antibiotics as prescribed until gone.  The stitches need to be removed in 10-14 days.    Please return to the ED for redness around the wounds, pus drainage from wounds, fever, worsening pain, or any new or worsening concerns.

## 2025-05-11 NOTE — ED PROVIDER NOTES
Encounter Date: 5/11/2025       History     Chief Complaint   Patient presents with    Laceration     Several lacs to fingers and palm of left hand. Pt needs tetanus     Patient is a 59 y.o. female with past medical history of anxiety and depression, metastatic cancer in remission in 2013, history of IVDA resolved in 2012 per the patient who presents to ED via self for concern for hand injury which began around 8:00 p.m. last night.  Patient reports she was climbing a metal fence because a tenet got locked out of her house.  Patient reports when she was coming down the other side her left hand slid on the fence and got cut.  Patient reports she cleaned with hydrogen peroxide last night and took Tylenol and went to bed.  Patient is unsure when her last tetanus immunization was.  Patient denies any recent antibiotics use.  Patient denies blood thinners.  Patient is awake and alert in no acute distress.            Review of patient's allergies indicates:  No Known Allergies  Past Medical History:   Diagnosis Date    Blood transfusion     Cervical cancer 2009    mets to abdomen and lungs    Cervical cancer 01/01/2009    mets to abdomen and lungs      Degenerative lumbar disc     from radiation    IV drug abuse 10/26/2013    Metastatic cancer to chest wall     chemo in 02/2010    Periumbilical abdominal pain 07/16/2019    S/P radiation therapy > 12 wks ago 2009    Pelvic/Abdomen radiation 2009; external and internal radiation    Sepsis due to Gram negative bacteria 10/26/2013    Wears glasses      Past Surgical History:   Procedure Laterality Date    CHOLECYSTECTOMY      COLONOSCOPY  11/20/2024    OOPHORECTOMY  2009    Port-a-Cath placement      SMALL INTESTINE SURGERY  2010    some of intestines removed      TONSILLECTOMY      TOTAL ABDOMINAL HYSTERECTOMY  2009    TUBAL LIGATION  2002     Family History   Problem Relation Name Age of Onset    Spina bifida Mother Connie ford     Heart disease Mother Connie ford      No Known Problems Father      Heart disease Maternal Grandmother Josee Fish     Pancreatic cancer Maternal Grandfather Calos Fish     Cancer Maternal Grandfather Calos Fish      Social History[1]  Review of Systems   Constitutional:  Negative for fever.   Respiratory:  Negative for shortness of breath.    Cardiovascular:  Negative for chest pain.   Musculoskeletal:  Negative for back pain.   Skin:  Positive for wound. Negative for color change, pallor and rash.   Neurological:  Negative for weakness.   Hematological:  Does not bruise/bleed easily.   All other systems reviewed and are negative.      Physical Exam     Initial Vitals [05/11/25 0927]   BP Pulse Resp Temp SpO2   120/75 60 18 97.9 °F (36.6 °C) 99 %      MAP       --         Physical Exam    Nursing note and vitals reviewed.  Constitutional: She appears well-developed and well-nourished. She is not diaphoretic. No distress.   HENT:   Head: Normocephalic and atraumatic.   Right Ear: External ear normal.   Left Ear: External ear normal.   Nose: Nose normal.   Eyes: EOM are normal.   Neck:   Normal range of motion.  Cardiovascular:  Normal rate, regular rhythm, normal heart sounds and intact distal pulses.     Exam reveals no gallop and no friction rub.       No murmur heard.  Pulmonary/Chest: Breath sounds normal. No respiratory distress. She has no wheezes. She has no rhonchi. She has no rales. She exhibits no tenderness.   Musculoskeletal:      Left wrist: Normal. Normal pulse.      Left hand: Swelling, laceration, tenderness and bony tenderness present. No deformity. Decreased range of motion. Normal strength. Normal sensation. Normal capillary refill.        Hands:       Cervical back: Normal range of motion.     Neurological: She is alert and oriented to person, place, and time. She has normal strength. GCS score is 15. GCS eye subscore is 4. GCS verbal subscore is 5. GCS motor subscore is 6.   Skin: Skin is warm and dry. Capillary refill  takes less than 2 seconds.   Psychiatric: She has a normal mood and affect. Her behavior is normal. Judgment and thought content normal.         ED Course   Lac Repair    Date/Time: 5/11/2025 11:30 AM    Performed by: Jody Gannon NP  Authorized by: Pablo Barrios MD    Consent:     Consent obtained:  Verbal    Consent given by:  Patient    Risks, benefits, and alternatives were discussed: yes      Risks discussed:  Infection, pain, poor cosmetic result, poor wound healing and need for additional repair    Alternatives discussed:  No treatment  Universal protocol:     Procedure explained and questions answered to patient or proxy's satisfaction: yes      Patient identity confirmed:  Verbally with patient  Anesthesia:     Anesthesia method:  Local infiltration    Local anesthetic:  Lidocaine 1% w/o epi  Laceration details:     Location:  Finger    Finger location:  L long finger    Length (cm):  2.5  Pre-procedure details:     Preparation:  Patient was prepped and draped in usual sterile fashion and imaging obtained to evaluate for foreign bodies  Exploration:     Hemostasis achieved with:  Direct pressure    Imaging obtained: x-ray      Imaging outcome: foreign body not noted      Wound exploration: wound explored through full range of motion and entire depth of wound visualized      Wound extent: no foreign bodies/material noted and no underlying fracture noted    Treatment:     Area cleansed with:  Povidone-iodine and saline    Amount of cleaning:  Extensive    Irrigation solution:  Sterile saline    Irrigation volume:  2 L    Irrigation method:  Syringe  Skin repair:     Repair method:  Sutures    Suture size:  5-0    Suture material:  Nylon    Suture technique:  Simple interrupted    Number of sutures:  3  Approximation:     Approximation:  Close  Repair type:     Repair type:  Simple  Post-procedure details:     Dressing:  Antibiotic ointment, non-adherent dressing and splint for protection     Procedure completion:  Tolerated well, no immediate complications  Lac Repair    Date/Time: 5/11/2025 11:30 AM    Performed by: Jody Gannon NP  Authorized by: Pablo Barrios MD    Consent:     Consent obtained:  Verbal    Consent given by:  Patient    Risks, benefits, and alternatives were discussed: yes      Risks discussed:  Infection, pain, poor cosmetic result and poor wound healing    Alternatives discussed:  No treatment  Universal protocol:     Procedure explained and questions answered to patient or proxy's satisfaction: yes      Patient identity confirmed:  Verbally with patient  Anesthesia:     Anesthesia method:  Local infiltration    Local anesthetic:  Lidocaine 1% w/o epi  Laceration details:     Location:  Finger    Finger location:  L ring finger    Length (cm):  1.5  Pre-procedure details:     Preparation:  Patient was prepped and draped in usual sterile fashion and imaging obtained to evaluate for foreign bodies  Exploration:     Hemostasis achieved with:  Direct pressure    Imaging obtained: x-ray      Imaging outcome: foreign body not noted      Wound exploration: wound explored through full range of motion and entire depth of wound visualized      Wound extent: no foreign bodies/material noted and no underlying fracture noted    Treatment:     Area cleansed with:  Povidone-iodine and saline    Amount of cleaning:  Extensive    Irrigation solution:  Sterile saline    Irrigation volume:  2L    Irrigation method:  Syringe  Skin repair:     Repair method:  Sutures    Suture size:  4-0    Suture material:  Nylon    Suture technique:  Simple interrupted    Number of sutures:  2  Approximation:     Approximation:  Close  Repair type:     Repair type:  Simple  Post-procedure details:     Dressing:  Antibiotic ointment, non-adherent dressing and splint for protection    Procedure completion:  Tolerated well, no immediate complications    Labs Reviewed   HEPATITIS C ANTIBODY   HIV 1 / 2  ANTIBODY          Imaging Results              X-Ray Hand 3 View Left (Final result)  Result time 05/11/25 09:59:51      Final result by Joaquin Galeano MD (05/11/25 09:59:51)                   Impression:      1. No acute osseous abnormalities or radiopaque soft tissue foreign bodies.      Electronically signed by: Joaquin Galeano  Date:    05/11/2025  Time:    09:59               Narrative:    EXAMINATION:  XR HAND COMPLETE 3 VIEW LEFT    CLINICAL HISTORY:  hand laceration;    COMPARISON:  None.    FINDINGS:  Three views are negative for fracture or dislocation.  No osseous destructive lesion or significant arthritic change is identified.  No radiopaque soft tissue foreign bodies are demonstrated.                                       Medications   LIDOcaine (PF) 10 mg/ml (1%) injection 100 mg (100 mg Infiltration Given 5/11/25 1000)   bacitracin ointment ( Topical (Top) Given 5/11/25 1000)   Tdap vaccine injection 0.5 mL (0.5 mLs Intramuscular Given 5/11/25 1050)   cephALEXin capsule 500 mg (500 mg Oral Given 5/11/25 1216)     Medical Decision Making  MDM    Patient presents for emergent evaluation of acute hand injury that poses a possible threat to life and/or bodily function.    Differential diagnosis included but not limited to laceration, abrasion, avulsion, fracture, tendon injury, ligamentous injury.  In the ED patient found to have acute clear lung sounds bilaterally with no increased work of breathing.  Patient has multilevel abrasions/avulsions and 2 lacerations to the left hand.  The lacerations are on palmar aspect of the left 3rd and 4th fingers.  Patient has a normal cap refill and sensation distally.  Patient has mostly normal range of motion and normal strength of all 5 fingers.  Patient has a limited range of motion of the left index finger with a avulsion at the palmar aspect of the base of the finger.      Imaging significant for x-ray left hand significant for no acute osseous  abnormalities or radiopaque soft tissue foreign bodies.    Lacerations to the left middle and ring fingers with a.  That has sutures.  Patient tolerated well.  Entire hand and all wounds copiously cleaned with Betadine and saline.  Patient tolerated well.  Wounds applied with antibacterial ointment finger splints.    Discharge MDM  I discussed the patient presentation labs, X-rays, findings with ED attending Dr. Barrios.    Patient was managed in the ED with tetanus immunization and wound care.  The response to treatment was good.  Discharged with oral antibiotics.  Patient was discharged in stable condition with close follow up with hand surgery.  Detailed return precautions discussed to return to the ED for any new or worsening concerns.  Patient verbalizes understanding.    NP uses Epic and voice recognition software prone to occasional and minor errors that may persist in the medical record.     Amount and/or Complexity of Data Reviewed  Radiology: ordered. Decision-making details documented in ED Course.    Risk  OTC drugs.  Prescription drug management.               ED Course as of 05/11/25 1720   Sun May 11, 2025   1011 X-Ray Hand 3 View Left  Impression:     1. No acute osseous abnormalities or radiopaque soft tissue foreign bodies.   [MP]   1114 Rings successfully cut off and removed [MP]      ED Course User Index  [MP] Jody Gannon, SCOTT                           Clinical Impression:  Final diagnoses:  [S61.213A] Laceration of left middle finger without foreign body without damage to nail, initial encounter (Primary)  [S61.215A] Laceration of left ring finger without foreign body without damage to nail, initial encounter  [S61.209A] Avulsion of finger, initial encounter          ED Disposition Condition    Discharge Stable          ED Prescriptions       Medication Sig Dispense Start Date End Date Auth. Provider    cephALEXin (KEFLEX) 500 MG capsule Take 1 capsule (500 mg total) by mouth 4 (four) times  daily. for 7 days 28 capsule 5/11/2025 5/18/2025 Jody Gannon NP          Follow-up Information       Follow up With Specialties Details Why Contact Info Additional Information    Jesika Quinn MD Internal Medicine   [address]  [address]  [facility] [address]  [phone]       DMITRY Cisneros MD Hand Surgery, General Surgery Schedule an appointment as soon as possible for a visit  For recheck/continuing care [address]  [address]  [facility] [address]  [phone]       [facility] - Emergency Dept Emergency Medicine  If symptoms worsen [address] [id number]  [phone] 1st floor               [1]   Social History  Tobacco Use    Smoking status: Every Day     Current packs/day: 1.00     Average packs/day: 1 pack/day for 43.4 years (43.4 ttl pk-yrs)     Types: Cigarettes     Start date: 1/1/1982    Smokeless tobacco: Never   Substance Use Topics    Alcohol use: Never    Drug use: Not Currently     Types: Marijuana, Methamphetamines     Comment: Zero problems with anything since 2002        Jody Gannon NP  05/11/25 [id number]

## 2025-05-11 NOTE — Clinical Note
"Charleen Louis (Joellen) was seen and treated in our emergency department on 5/11/2025.  She may return to work on 05/16/2025.       If you have any questions or concerns, please don't hesitate to call.      Jody Gannon NP"